# Patient Record
Sex: MALE | Race: OTHER | Employment: FULL TIME | URBAN - METROPOLITAN AREA
[De-identification: names, ages, dates, MRNs, and addresses within clinical notes are randomized per-mention and may not be internally consistent; named-entity substitution may affect disease eponyms.]

---

## 2018-04-13 PROCEDURE — 99284 EMERGENCY DEPT VISIT MOD MDM: CPT

## 2018-04-14 ENCOUNTER — HOSPITAL ENCOUNTER (EMERGENCY)
Facility: HOSPITAL | Age: 23
Discharge: HOME | End: 2018-04-14
Attending: EMERGENCY MEDICINE
Payer: COMMERCIAL

## 2018-04-14 ENCOUNTER — APPOINTMENT (OUTPATIENT)
Dept: GENERAL RADIOLOGY | Facility: HOSPITAL | Age: 23
End: 2018-04-14
Attending: EMERGENCY MEDICINE
Payer: COMMERCIAL

## 2018-04-14 VITALS
DIASTOLIC BLOOD PRESSURE: 68 MMHG | SYSTOLIC BLOOD PRESSURE: 125 MMHG | BODY MASS INDEX: 23.86 KG/M2 | TEMPERATURE: 99 F | OXYGEN SATURATION: 99 % | RESPIRATION RATE: 20 BRPM | HEART RATE: 87 BPM | WEIGHT: 152 LBS | HEIGHT: 67 IN

## 2018-04-14 DIAGNOSIS — Q67.6 PECTUS EXCAVATUM: Primary | ICD-10-CM

## 2018-04-14 DIAGNOSIS — R07.89 CHEST WALL PAIN: ICD-10-CM

## 2018-04-14 PROCEDURE — 93005 ELECTROCARDIOGRAM TRACING: CPT

## 2018-04-14 PROCEDURE — 71046 X-RAY EXAM CHEST 2 VIEWS: CPT | Performed by: EMERGENCY MEDICINE

## 2018-04-14 PROCEDURE — 93010 ELECTROCARDIOGRAM REPORT: CPT | Performed by: EMERGENCY MEDICINE

## 2018-04-14 NOTE — ED INITIAL ASSESSMENT (HPI)
LEft sided chest pain and SOB for the last 3 years, states that symptoms worsened yesterday. PT also reports he is \"Shaking\" and dizzy.

## 2018-04-14 NOTE — ED PROVIDER NOTES
Patient Seen in: [facility] Emergency Department     History   Patient presents with:  Chest Pain    Stated Complaint: SOB, chest pain    HPI    26year old male complains of \"chest pain all my life\" is worse in the past 2 years since being in a c rate and regular rhythm. Pulmonary/Chest: Effort normal and breath sounds normal. No accessory muscle usage or stridor. No apnea and no tachypnea. No respiratory distress. He exhibits no retraction. Abdominal: Soft. There is no tenderness.    [address] discussed pertinent results, any required  acute management issues, and/or I did need for follow-up, with the patient/gaurdian. See radiology reports for details.      Monitor Interpretation: normal sinus rhythm with a rate of 80  Oxygen Saturation: 98% on up information were provided prior to discharge from the ED if sent home.  We also recommended that the patient schedule follow up care with a primary care provider as soon as possible to obtain basic health screening including reassessment of blood pressur

## 2018-04-16 ENCOUNTER — OFFICE VISIT (OUTPATIENT)
Dept: FAMILY MEDICINE CLINIC | Facility: CLINIC | Age: 23
End: 2018-04-16

## 2018-04-16 VITALS
WEIGHT: 156 LBS | DIASTOLIC BLOOD PRESSURE: 76 MMHG | BODY MASS INDEX: 24 KG/M2 | SYSTOLIC BLOOD PRESSURE: 114 MMHG | TEMPERATURE: 98 F | HEART RATE: 90 BPM | OXYGEN SATURATION: 95 %

## 2018-04-16 DIAGNOSIS — R06.02 SHORTNESS OF BREATH: Primary | ICD-10-CM

## 2018-04-16 DIAGNOSIS — Q67.6 CONGENITAL PECTUS EXCAVATUM: ICD-10-CM

## 2018-04-16 DIAGNOSIS — R07.89 MUSCULAR CHEST PAIN: ICD-10-CM

## 2018-04-16 PROCEDURE — 1111F DSCHRG MED/CURRENT MED MERGE: CPT | Performed by: FAMILY MEDICINE

## 2018-04-16 PROCEDURE — 99212 OFFICE O/P EST SF 10 MIN: CPT | Performed by: FAMILY MEDICINE

## 2018-04-16 PROCEDURE — 99203 OFFICE O/P NEW LOW 30 MIN: CPT | Performed by: FAMILY MEDICINE

## 2018-04-16 NOTE — PROGRESS NOTES
HPI:    Patient ID: Ira Whitten is a 26year old male. HPI  Patient presents with:  ER F/U: f/u [facility] E.R 4/14-DX: Pectus excavatum, chest pain  Has chest pain with minimal exertion. At times SOB. Seen in ED and released after testing.  Chronic and

## 2018-05-18 ENCOUNTER — HOSPITAL ENCOUNTER (EMERGENCY)
Facility: HOSPITAL | Age: 23
Discharge: HOME | End: 2018-05-18
Attending: EMERGENCY MEDICINE
Payer: COMMERCIAL

## 2018-05-18 VITALS
HEIGHT: 67 IN | BODY MASS INDEX: 23.54 KG/M2 | TEMPERATURE: 99 F | DIASTOLIC BLOOD PRESSURE: 81 MMHG | SYSTOLIC BLOOD PRESSURE: 123 MMHG | RESPIRATION RATE: 20 BRPM | OXYGEN SATURATION: 99 % | HEART RATE: 101 BPM | WEIGHT: 150 LBS

## 2018-05-18 DIAGNOSIS — H66.91 RIGHT OTITIS MEDIA, UNSPECIFIED OTITIS MEDIA TYPE: Primary | ICD-10-CM

## 2018-05-18 PROCEDURE — 96372 THER/PROPH/DIAG INJ SC/IM: CPT

## 2018-05-18 PROCEDURE — 99284 EMERGENCY DEPT VISIT MOD MDM: CPT

## 2018-05-18 PROCEDURE — 94640 AIRWAY INHALATION TREATMENT: CPT

## 2018-05-18 RX ORDER — MELOXICAM 7.5 MG/1
7.5 TABLET ORAL DAILY
Qty: 14 TABLET | Refills: 0 | Status: SHIPPED | OUTPATIENT
Start: 2018-05-18 | End: 2018-05-25

## 2018-05-18 RX ORDER — IBUPROFEN 800 MG/1
800 TABLET ORAL ONCE
Status: COMPLETED | OUTPATIENT
Start: 2018-05-18 | End: 2018-05-18

## 2018-05-18 RX ORDER — ALBUTEROL SULFATE 90 UG/1
2 AEROSOL, METERED RESPIRATORY (INHALATION) EVERY 4 HOURS PRN
Qty: 1 INHALER | Refills: 0 | Status: SHIPPED | OUTPATIENT
Start: 2018-05-18 | End: 2018-05-25

## 2018-05-18 RX ORDER — IPRATROPIUM BROMIDE AND ALBUTEROL SULFATE 2.5; .5 MG/3ML; MG/3ML
3 SOLUTION RESPIRATORY (INHALATION) ONCE
Status: COMPLETED | OUTPATIENT
Start: 2018-05-18 | End: 2018-05-18

## 2018-05-18 RX ORDER — KETOROLAC TROMETHAMINE 30 MG/ML
60 INJECTION, SOLUTION INTRAMUSCULAR; INTRAVENOUS ONCE
Status: DISCONTINUED | OUTPATIENT
Start: 2018-05-18 | End: 2018-05-18

## 2018-05-18 RX ORDER — AMOXICILLIN 875 MG/1
875 TABLET, COATED ORAL ONCE
Status: COMPLETED | OUTPATIENT
Start: 2018-05-18 | End: 2018-05-18

## 2018-05-18 RX ORDER — AMOXICILLIN 875 MG/1
875 TABLET, COATED ORAL 2 TIMES DAILY
Qty: 20 TABLET | Refills: 0 | Status: SHIPPED | OUTPATIENT
Start: 2018-05-18 | End: 2018-05-28

## 2018-05-18 NOTE — ED NOTES
Pt verbalized having a runny nose & cough for a week. Pt denied fevers. Pt said that today as he was trying to use the humidifier for his runny nose, he bent and then felt right ear pain. Pt said that something felt in his ear.  Pt denied any drainage from

## 2018-05-18 NOTE — ED PROVIDER NOTES
Patient Seen in: [facility] AND [facility] Emergency Department    History   Patient presents with:  Cough/URI    Stated Complaint: Cough     HPI    25 yo M without PMH presenting with three days of cough/sore throat now with several hours of atraumatic right ear Cooperative. Nursing note and vitals reviewed.         ED Course   Labs Reviewed - No data to display    MDM     DIFFERENTIAL DIAGNOSIS: After history and physical exam differential diagnosis includes but is not limited to URI, strep pharyngitis, otitis me

## 2018-05-21 ENCOUNTER — APPOINTMENT (OUTPATIENT)
Dept: GENERAL RADIOLOGY | Facility: HOSPITAL | Age: 23
End: 2018-05-21
Attending: EMERGENCY MEDICINE
Payer: MEDICAID

## 2018-05-21 ENCOUNTER — HOSPITAL ENCOUNTER (EMERGENCY)
Facility: HOSPITAL | Age: 23
Discharge: HOME | End: 2018-05-21
Attending: EMERGENCY MEDICINE
Payer: MEDICAID

## 2018-05-21 VITALS
SYSTOLIC BLOOD PRESSURE: 135 MMHG | HEART RATE: 97 BPM | OXYGEN SATURATION: 99 % | BODY MASS INDEX: 23.86 KG/M2 | DIASTOLIC BLOOD PRESSURE: 75 MMHG | TEMPERATURE: 98 F | RESPIRATION RATE: 16 BRPM | HEIGHT: 67 IN | WEIGHT: 152 LBS

## 2018-05-21 DIAGNOSIS — J40 BRONCHITIS: Primary | ICD-10-CM

## 2018-05-21 PROCEDURE — 99283 EMERGENCY DEPT VISIT LOW MDM: CPT

## 2018-05-21 PROCEDURE — 71046 X-RAY EXAM CHEST 2 VIEWS: CPT | Performed by: EMERGENCY MEDICINE

## 2018-05-21 RX ORDER — PSEUDOEPHEDRINE HYDROCHLORIDE 30 MG/1
30 TABLET ORAL EVERY 4 HOURS PRN
Qty: 24 TABLET | Refills: 0 | Status: SHIPPED | OUTPATIENT
Start: 2018-05-21 | End: 2018-05-25

## 2018-05-21 RX ORDER — BENZONATATE 100 MG/1
100 CAPSULE ORAL 3 TIMES DAILY PRN
Qty: 30 CAPSULE | Refills: 0 | Status: SHIPPED | OUTPATIENT
Start: 2018-05-21 | End: 2018-05-25

## 2018-05-21 NOTE — ED PROVIDER NOTES
Patient Seen in: [facility] Emergency Department    History   Patient presents with:  Cough/URI      HPI    Patient presents to the ED complaining of a dry cough for the past 7 days, occasional posttussive emesis emesis.   Ongoing right ear pain, r exhibits no tenderness. Dry cough on exam   Abdominal: Soft. He exhibits no distension. Musculoskeletal: He exhibits no edema or deformity. Neurological: He is alert. Skin: Skin is warm and dry. Nursing note and vitals reviewed.       ED Course [place] [address]  [phone]    Schedule an appointment as soon as possible for a visit in 3 days        Medications Prescribed:  Discharge Medication List as of 5/21/2018  4:33 AM    START taking these medications    benzonatate 100 MG Oral C

## 2018-05-25 ENCOUNTER — OFFICE VISIT (OUTPATIENT)
Dept: FAMILY MEDICINE CLINIC | Facility: CLINIC | Age: 23
End: 2018-05-25

## 2018-05-25 VITALS
SYSTOLIC BLOOD PRESSURE: 121 MMHG | WEIGHT: 151 LBS | DIASTOLIC BLOOD PRESSURE: 82 MMHG | TEMPERATURE: 98 F | HEART RATE: 89 BPM | BODY MASS INDEX: 24 KG/M2

## 2018-05-25 DIAGNOSIS — H60.501 ACUTE OTITIS EXTERNA OF RIGHT EAR, UNSPECIFIED TYPE: Primary | ICD-10-CM

## 2018-05-25 PROCEDURE — 99212 OFFICE O/P EST SF 10 MIN: CPT | Performed by: FAMILY MEDICINE

## 2018-05-25 PROCEDURE — 99213 OFFICE O/P EST LOW 20 MIN: CPT | Performed by: FAMILY MEDICINE

## 2018-05-25 RX ORDER — NEOMYCIN SULFATE, POLYMYXIN B SULFATE, HYDROCORTISONE 3.5; 10000; 1 MG/ML; [USP'U]/ML; MG/ML
SOLUTION/ DROPS AURICULAR (OTIC)
Qty: 1 BOTTLE | Refills: 0 | Status: SHIPPED | OUTPATIENT
Start: 2018-05-25 | End: 2020-02-13 | Stop reason: ALTCHOICE

## 2018-05-25 NOTE — PROGRESS NOTES
HPI:    Patient ID: Lexii Joens is a 26year old male. HPI  Patient presents with:  Ear Pain: RT ear pain with craking noise, was in ER for [facility] for ear infection on friday /18/18    Review of Systems   Constitutional: Negative.     HENT: Positive

## 2019-10-02 ENCOUNTER — HOSPITAL ENCOUNTER (EMERGENCY)
Facility: HOSPITAL | Age: 24
Discharge: HOME | End: 2019-10-02
Attending: EMERGENCY MEDICINE
Payer: COMMERCIAL

## 2019-10-02 ENCOUNTER — APPOINTMENT (OUTPATIENT)
Dept: CT IMAGING | Facility: HOSPITAL | Age: 24
End: 2019-10-02
Attending: EMERGENCY MEDICINE
Payer: COMMERCIAL

## 2019-10-02 VITALS
HEIGHT: 67 IN | TEMPERATURE: 98 F | OXYGEN SATURATION: 96 % | DIASTOLIC BLOOD PRESSURE: 68 MMHG | WEIGHT: 175 LBS | SYSTOLIC BLOOD PRESSURE: 126 MMHG | HEART RATE: 65 BPM | RESPIRATION RATE: 18 BRPM | BODY MASS INDEX: 27.47 KG/M2

## 2019-10-02 DIAGNOSIS — K52.9 GASTROENTERITIS: Primary | ICD-10-CM

## 2019-10-02 PROCEDURE — 99284 EMERGENCY DEPT VISIT MOD MDM: CPT

## 2019-10-02 PROCEDURE — 85025 COMPLETE CBC W/AUTO DIFF WBC: CPT

## 2019-10-02 PROCEDURE — 74177 CT ABD & PELVIS W/CONTRAST: CPT | Performed by: EMERGENCY MEDICINE

## 2019-10-02 PROCEDURE — 80048 BASIC METABOLIC PNL TOTAL CA: CPT | Performed by: EMERGENCY MEDICINE

## 2019-10-02 PROCEDURE — 96374 THER/PROPH/DIAG INJ IV PUSH: CPT

## 2019-10-02 PROCEDURE — 85025 COMPLETE CBC W/AUTO DIFF WBC: CPT | Performed by: EMERGENCY MEDICINE

## 2019-10-02 PROCEDURE — 96361 HYDRATE IV INFUSION ADD-ON: CPT

## 2019-10-02 PROCEDURE — 81001 URINALYSIS AUTO W/SCOPE: CPT | Performed by: EMERGENCY MEDICINE

## 2019-10-02 PROCEDURE — 80048 BASIC METABOLIC PNL TOTAL CA: CPT

## 2019-10-02 RX ORDER — ONDANSETRON 2 MG/ML
4 INJECTION INTRAMUSCULAR; INTRAVENOUS ONCE
Status: COMPLETED | OUTPATIENT
Start: 2019-10-02 | End: 2019-10-02

## 2019-10-02 RX ORDER — DICYCLOMINE HCL 20 MG
20 TABLET ORAL 4 TIMES DAILY PRN
Qty: 30 TABLET | Refills: 0 | Status: SHIPPED | OUTPATIENT
Start: 2019-10-02 | End: 2019-11-01

## 2019-10-02 RX ORDER — ONDANSETRON 4 MG/1
4 TABLET, ORALLY DISINTEGRATING ORAL EVERY 4 HOURS PRN
Qty: 10 TABLET | Refills: 0 | Status: SHIPPED | OUTPATIENT
Start: 2019-10-02 | End: 2019-10-09

## 2019-10-02 NOTE — ED PROVIDER NOTES
Patient Seen in: [facility] AND [facility] Emergency Department      History   Patient presents with:  Abdomen/Flank Pain (GI/)  Nausea/Vomiting/Diarrhea (gastrointestinal)    Stated Complaint: abdominal pain; black stool    HPI    30-year-old male with no sig src Oral   SpO2 97 %   O2 Device None (Room air)       Current:/77   Pulse 74   Temp 97.8 °F (36.6 °C) (Oral)   Resp 16   Ht 170.2 cm (5' 7\")   Wt 79.4 kg   SpO2 96%   BMI 27.41 kg/m²         Physical Exam   Constitutional: He is oriented to person, Result Value Ref Range    Hold Gold Auto Resulted    BASIC METABOLIC PANEL (8)    Collection Time: 10/02/19  5:49 AM   Result Value Ref Range    Glucose 87 70 - 99 mg/dL    Sodium 140 136 - 145 mmol/L    Potassium 3.8 3.5 - 5.1 mmol/L    Chloride 108 98 Neutrophil % 56.4 %    Lymphocyte % 26.6 %    Monocyte % 14.2 %    Eosinophil % 2.2 %    Basophil % 0.4 %    Immature Granulocyte % 0.2 %       Imaging Results Available and Reviewed by me while in ED:  Ct Abdomen+pelvis(contrast Only)(cpt=74177)    Result problem including gastroenteritis, diverticulitis, abscess.           Disposition and Plan     Clinical Impression:  Gastroenteritis  (primary encounter diagnosis)    Disposition:  Discharge  10/2/2019  8:08 am    Follow-up:  DO Mia Edwards [facility]

## 2019-10-02 NOTE — ED NOTES
[facility] Assuming care of [facility]. Pt resting on cart. Awaiting CT read. Refusing pain meds at this time.  leticia

## 2020-02-13 ENCOUNTER — OFFICE VISIT (OUTPATIENT)
Dept: FAMILY MEDICINE CLINIC | Facility: CLINIC | Age: 25
End: 2020-02-13
Payer: COMMERCIAL

## 2020-02-13 VITALS
HEART RATE: 87 BPM | WEIGHT: 180 LBS | BODY MASS INDEX: 28.25 KG/M2 | HEIGHT: 67 IN | TEMPERATURE: 98 F | DIASTOLIC BLOOD PRESSURE: 89 MMHG | SYSTOLIC BLOOD PRESSURE: 138 MMHG

## 2020-02-13 DIAGNOSIS — Z00.00 ROUTINE GENERAL MEDICAL EXAMINATION AT A HEALTH CARE FACILITY: Primary | ICD-10-CM

## 2020-02-13 PROCEDURE — 99395 PREV VISIT EST AGE 18-39: CPT | Performed by: FAMILY MEDICINE

## 2020-02-13 NOTE — PROGRESS NOTES
HPI:    Patient ID: Ramiro Love is a 23year old male.     HPI  Patient presents with:  Physical: annual physical,   Bump: bumps on head with dry skull, hair falling out   Allergic Rxn Allergies: cuts on tongue     Review of Systems   Constitutional: Differential With Platelet      TSH [E]      Meds This Visit:  Requested Prescriptions      No prescriptions requested or ordered in this encounter       Imaging & Referrals:  None       QC#[id number]

## 2020-02-18 ENCOUNTER — LAB ENCOUNTER (OUTPATIENT)
Dept: LAB | Age: 25
End: 2020-02-18
Attending: FAMILY MEDICINE
Payer: COMMERCIAL

## 2020-02-18 ENCOUNTER — OFFICE VISIT (OUTPATIENT)
Dept: FAMILY MEDICINE CLINIC | Facility: CLINIC | Age: 25
End: 2020-02-18
Payer: COMMERCIAL

## 2020-02-18 VITALS
BODY MASS INDEX: 28.25 KG/M2 | WEIGHT: 180 LBS | SYSTOLIC BLOOD PRESSURE: 140 MMHG | TEMPERATURE: 99 F | OXYGEN SATURATION: 96 % | DIASTOLIC BLOOD PRESSURE: 85 MMHG | HEIGHT: 67 IN | HEART RATE: 102 BPM

## 2020-02-18 DIAGNOSIS — Z00.00 ROUTINE GENERAL MEDICAL EXAMINATION AT A HEALTH CARE FACILITY: ICD-10-CM

## 2020-02-18 DIAGNOSIS — A08.4 VIRAL GASTROENTERITIS: Primary | ICD-10-CM

## 2020-02-18 DIAGNOSIS — R11.0 NAUSEA: ICD-10-CM

## 2020-02-18 LAB
ALBUMIN SERPL-MCNC: 4.5 G/DL (ref 3.4–5)
ALBUMIN/GLOB SERPL: 1.5 {RATIO} (ref 1–2)
ALP LIVER SERPL-CCNC: 96 U/L (ref 45–117)
ALT SERPL-CCNC: 270 U/L (ref 16–61)
ANION GAP SERPL CALC-SCNC: 5 MMOL/L (ref 0–18)
AST SERPL-CCNC: 79 U/L (ref 15–37)
BASOPHILS # BLD AUTO: 0.03 X10(3) UL (ref 0–0.2)
BASOPHILS NFR BLD AUTO: 0.4 %
BILIRUB SERPL-MCNC: 0.6 MG/DL (ref 0.1–2)
BUN BLD-MCNC: 10 MG/DL (ref 7–18)
BUN/CREAT SERPL: 9.9 (ref 10–20)
CALCIUM BLD-MCNC: 9.2 MG/DL (ref 8.5–10.1)
CHLORIDE SERPL-SCNC: 103 MMOL/L (ref 98–112)
CHOLEST SMN-MCNC: 155 MG/DL (ref ?–200)
CO2 SERPL-SCNC: 29 MMOL/L (ref 21–32)
CREAT BLD-MCNC: 1.01 MG/DL (ref 0.7–1.3)
DEPRECATED RDW RBC AUTO: 41 FL (ref 35.1–46.3)
EOSINOPHIL # BLD AUTO: 0.02 X10(3) UL (ref 0–0.7)
EOSINOPHIL NFR BLD AUTO: 0.3 %
ERYTHROCYTE [DISTWIDTH] IN BLOOD BY AUTOMATED COUNT: 12.1 % (ref 11–15)
GLOBULIN PLAS-MCNC: 3 G/DL (ref 2.8–4.4)
GLUCOSE BLD-MCNC: 102 MG/DL (ref 70–99)
HCT VFR BLD AUTO: 48.2 % (ref 39–53)
HDLC SERPL-MCNC: 54 MG/DL (ref 40–59)
HGB BLD-MCNC: 16.3 G/DL (ref 13–17.5)
IMM GRANULOCYTES # BLD AUTO: 0.03 X10(3) UL (ref 0–1)
IMM GRANULOCYTES NFR BLD: 0.4 %
LDLC SERPL CALC-MCNC: 94 MG/DL (ref ?–100)
LYMPHOCYTES # BLD AUTO: 0.51 X10(3) UL (ref 1–4)
LYMPHOCYTES NFR BLD AUTO: 6.4 %
M PROTEIN MFR SERPL ELPH: 7.5 G/DL (ref 6.4–8.2)
MCH RBC QN AUTO: 31.1 PG (ref 26–34)
MCHC RBC AUTO-ENTMCNC: 33.8 G/DL (ref 31–37)
MCV RBC AUTO: 92 FL (ref 80–100)
MONOCYTES # BLD AUTO: 1.04 X10(3) UL (ref 0.1–1)
MONOCYTES NFR BLD AUTO: 13.1 %
NEUTROPHILS # BLD AUTO: 6.33 X10 (3) UL (ref 1.5–7.7)
NEUTROPHILS # BLD AUTO: 6.33 X10(3) UL (ref 1.5–7.7)
NEUTROPHILS NFR BLD AUTO: 79.4 %
NONHDLC SERPL-MCNC: 101 MG/DL (ref ?–130)
OSMOLALITY SERPL CALC.SUM OF ELEC: 283 MOSM/KG (ref 275–295)
PATIENT FASTING Y/N/NP: YES
PATIENT FASTING Y/N/NP: YES
PLATELET # BLD AUTO: 173 10(3)UL (ref 150–450)
POTASSIUM SERPL-SCNC: 4.2 MMOL/L (ref 3.5–5.1)
RBC # BLD AUTO: 5.24 X10(6)UL (ref 4.3–5.7)
SODIUM SERPL-SCNC: 137 MMOL/L (ref 136–145)
TRIGL SERPL-MCNC: 37 MG/DL (ref 30–149)
TSI SER-ACNC: 0.58 MIU/ML (ref 0.36–3.74)
VLDLC SERPL CALC-MCNC: 7 MG/DL (ref 0–30)
WBC # BLD AUTO: 8 X10(3) UL (ref 4–11)

## 2020-02-18 PROCEDURE — 84443 ASSAY THYROID STIM HORMONE: CPT

## 2020-02-18 PROCEDURE — 80053 COMPREHEN METABOLIC PANEL: CPT

## 2020-02-18 PROCEDURE — 85025 COMPLETE CBC W/AUTO DIFF WBC: CPT

## 2020-02-18 PROCEDURE — 99213 OFFICE O/P EST LOW 20 MIN: CPT | Performed by: FAMILY MEDICINE

## 2020-02-18 PROCEDURE — 36415 COLL VENOUS BLD VENIPUNCTURE: CPT

## 2020-02-18 PROCEDURE — 80061 LIPID PANEL: CPT

## 2020-02-18 RX ORDER — ONDANSETRON 4 MG/1
4 TABLET, FILM COATED ORAL EVERY 8 HOURS PRN
Qty: 20 TABLET | Refills: 0 | Status: SHIPPED | OUTPATIENT
Start: 2020-02-18 | End: 2021-12-09

## 2020-02-18 NOTE — PATIENT INSTRUCTIONS
Viral Diarrhea (Adult)    Diarrhea caused by a virus is often called viral gastroenteritis. Many people call it the \"stomach flu,\" but it has nothing to do with influenza.  The virus that causes diarrhea affects the stomach and intestinal tract and usua · You may use acetaminophen or NSAIDS such as ibuprofen or naproxen to control fever unless another medicine was prescribed.  If you have chronic liver or kidney disease or ever had a stomach ulcer or gastrointestinal bleeding, talk with your healthcare pr During the next 24 hours (the second day) you may add the following to the above if you have improved:  · Hot cereal, plain toast, bread, rolls, crackers  · Plain noodles, rice, mashed potatoes, chicken noodle or rice soup  · Unsweetened canned fruit like · Severe drowsiness or trouble awakening  · Fainting or loss of consciousness  · Rapid heart rate  · Seizure  · Stiff neck  Date Last Reviewed: 3/1/2018  © 3673-6447 [employer]. [employer] [facility], [address] [place] [address]. All rights reserved. · Over-the-counter diarrhea or nausea medicines are generally OK unless you have bleeding, fever, or severe abdominal pain. · You may use acetaminophen or NSAID medicines like ibuprofen or naproxen to reduce pain and fever.  Don’t use these if you have chr · Don’t force yourself to eat, especially if you are having cramping, vomiting, or diarrhea. Don’t eat large amounts at a time, even if you are hungry. · If you eat, avoid fatty, greasy, spicy, or fried foods.   · Don’t eat dairy foods or drink milk if you Call your healthcare provider right away if any of these occur:  · Abdominal pain that gets worse  · Constant lower right abdominal pain  · Continued vomiting and inability to keep liquids down  · Diarrhea more than 5 times a day  · Blood in vomit or stool

## 2020-02-18 NOTE — PROGRESS NOTES
HPI:    Patient ID: Renuka Dover is a 23year old male. HPI  Patient presents with:  Vomiting: since Sunday   Body ache and/or chills  Diarrhea  Cough  Fever: last night      patient here with c/o nausea/ vomiting.   Vomiting better but nausea pers total) by mouth every 8 (eight) hours as needed for Nausea.        Imaging & Referrals:  None       XS#[id number]

## 2020-03-05 DIAGNOSIS — R74.8 ELEVATED LIVER ENZYMES: Primary | ICD-10-CM

## 2021-12-09 ENCOUNTER — OFFICE VISIT (OUTPATIENT)
Dept: INTERNAL MEDICINE CLINIC | Facility: CLINIC | Age: 26
End: 2021-12-09
Payer: COMMERCIAL

## 2021-12-09 VITALS
HEIGHT: 67 IN | OXYGEN SATURATION: 99 % | DIASTOLIC BLOOD PRESSURE: 72 MMHG | BODY MASS INDEX: 29.66 KG/M2 | SYSTOLIC BLOOD PRESSURE: 112 MMHG | WEIGHT: 189 LBS | HEART RATE: 98 BPM

## 2021-12-09 DIAGNOSIS — F41.1 GENERALIZED ANXIETY DISORDER: ICD-10-CM

## 2021-12-09 DIAGNOSIS — R51.9 NONINTRACTABLE HEADACHE, UNSPECIFIED CHRONICITY PATTERN, UNSPECIFIED HEADACHE TYPE: ICD-10-CM

## 2021-12-09 DIAGNOSIS — Z00.00 HEALTH MAINTENANCE EXAMINATION: Primary | ICD-10-CM

## 2021-12-09 DIAGNOSIS — M54.50 CHRONIC RIGHT-SIDED LOW BACK PAIN WITHOUT SCIATICA: ICD-10-CM

## 2021-12-09 DIAGNOSIS — G89.29 CHRONIC RIGHT-SIDED LOW BACK PAIN WITHOUT SCIATICA: ICD-10-CM

## 2021-12-09 DIAGNOSIS — R74.8 ELEVATED LIVER ENZYMES: ICD-10-CM

## 2021-12-09 DIAGNOSIS — K59.00 CONSTIPATION, UNSPECIFIED CONSTIPATION TYPE: ICD-10-CM

## 2021-12-09 DIAGNOSIS — E66.3 OVERWEIGHT (BMI 25.0-29.9): ICD-10-CM

## 2021-12-09 PROBLEM — F41.9 ANXIETY: Status: ACTIVE | Noted: 2021-12-09

## 2021-12-09 PROCEDURE — 90471 IMMUNIZATION ADMIN: CPT | Performed by: FAMILY MEDICINE

## 2021-12-09 PROCEDURE — 90651 9VHPV VACCINE 2/3 DOSE IM: CPT | Performed by: FAMILY MEDICINE

## 2021-12-09 PROCEDURE — 3008F BODY MASS INDEX DOCD: CPT | Performed by: FAMILY MEDICINE

## 2021-12-09 PROCEDURE — 82728 ASSAY OF FERRITIN: CPT | Performed by: FAMILY MEDICINE

## 2021-12-09 PROCEDURE — 36415 COLL VENOUS BLD VENIPUNCTURE: CPT | Performed by: FAMILY MEDICINE

## 2021-12-09 PROCEDURE — 99203 OFFICE O/P NEW LOW 30 MIN: CPT | Performed by: FAMILY MEDICINE

## 2021-12-09 PROCEDURE — 83036 HEMOGLOBIN GLYCOSYLATED A1C: CPT | Performed by: FAMILY MEDICINE

## 2021-12-09 PROCEDURE — 83540 ASSAY OF IRON: CPT | Performed by: FAMILY MEDICINE

## 2021-12-09 PROCEDURE — 84466 ASSAY OF TRANSFERRIN: CPT | Performed by: FAMILY MEDICINE

## 2021-12-09 PROCEDURE — 90715 TDAP VACCINE 7 YRS/> IM: CPT | Performed by: FAMILY MEDICINE

## 2021-12-09 PROCEDURE — 90686 IIV4 VACC NO PRSV 0.5 ML IM: CPT | Performed by: FAMILY MEDICINE

## 2021-12-09 PROCEDURE — 80074 ACUTE HEPATITIS PANEL: CPT | Performed by: FAMILY MEDICINE

## 2021-12-09 PROCEDURE — 3074F SYST BP LT 130 MM HG: CPT | Performed by: FAMILY MEDICINE

## 2021-12-09 PROCEDURE — 96127 BRIEF EMOTIONAL/BEHAV ASSMT: CPT | Performed by: FAMILY MEDICINE

## 2021-12-09 PROCEDURE — 80053 COMPREHEN METABOLIC PANEL: CPT | Performed by: FAMILY MEDICINE

## 2021-12-09 PROCEDURE — 3078F DIAST BP <80 MM HG: CPT | Performed by: FAMILY MEDICINE

## 2021-12-09 PROCEDURE — 90472 IMMUNIZATION ADMIN EACH ADD: CPT | Performed by: FAMILY MEDICINE

## 2021-12-09 PROCEDURE — 99385 PREV VISIT NEW AGE 18-39: CPT | Performed by: FAMILY MEDICINE

## 2021-12-09 NOTE — PATIENT INSTRUCTIONS
PATIENT INSTRUCTIONS    • Thank you for seeing me today, it was a pleasure taking care of you. • Please check out at the  and schedule a follow up appointment. Return in about 3 months (around 3/9/2022) for follow up.   • Please get your labs dr

## 2021-12-09 NOTE — ASSESSMENT & PLAN NOTE
Exercise and diet advised. Had CMP, lipid panel checked recently through work - will scan in records. Elevated liver enzymes - will check additional tests. Additionally check routine screen: HIV, hepatitis C  Tdap today. Flu shot today.   HPV vaccine to

## 2021-12-09 NOTE — PROGRESS NOTES
Pt presented to clinic today for blood draw. Per physician able to draw orders. Orders  documented within chart. Pt tolerated lab draw well.  verified.   Orders drawn include: Ferritin, Iron and tibc, HCV, A1C, and CMP  Site of draw: right arm

## 2021-12-09 NOTE — ASSESSMENT & PLAN NOTE
Patient reports intermittent episodes of bloating and constipation. I advised hydration, exercise, increasing fiber intake. Can use Metamucil or Citrucel as needed. Can also use MiraLAX as needed. Follow-up as needed.

## 2021-12-09 NOTE — ASSESSMENT & PLAN NOTE
Headaches, possibly migraine related. He does have these on a regular basis. For now I recommend that he keep a log for his headaches and bring them in so that I can assess how frequently this is truly occurring.   He does find good relief with use of ove

## 2021-12-09 NOTE — ASSESSMENT & PLAN NOTE
Advise healthy diet and exercise. He needs to increase his fiber intake as this is is affecting his bowel movements. Portion control emphasized. Can use nacho such as my fitness pal or weight watchers as needed.   Follow-up with me in 3 months

## 2021-12-09 NOTE — ASSESSMENT & PLAN NOTE
New diagnosis of generalized anxiety disorder. Patient reports that he has been anxious at baseline for a long time. [facility] behavioral health referral provided. Can consider medication therapy moving forward as well.   May benefit from cutting down

## 2021-12-09 NOTE — ASSESSMENT & PLAN NOTE
Unclear etiology. Patient does take quite a bit of Tylenol at this time–advised stopping Tylenol and acetaminophen products completely. Denies drinking alcohol on a regular basis. Patient's BMI is 29. 6–could be fatty liver as well.   I will check basic b

## 2021-12-09 NOTE — ASSESSMENT & PLAN NOTE
Patient reports history of chronic right lower back pain. I suspect this is more of a muscle strain. He has been seeing a chiropractor. I advised that he not see a chiropractor for now.   Instead if his back pain is bothering him, follow-up with me and I

## 2021-12-09 NOTE — PROGRESS NOTES
FAMILY MEDICINE CLINIC NOTE    HPI  Amanda Redman is a 27year old male presenting for physical    #Health Maintenance  -Diet: \"very bad\" - eat a lot of fast food. Stopped last week. Stopped pop for the past 2 months.   -Exercise: None currently   -Caryn cough, no SOB  CV: No chest pain, no palpitations  GI: No abd pain, no N/V/D  MSK: No edema, + chronic back pain  SKIN: No new rashes  NEURO: No numbness, no tingling, + HA    HEALTH MAINTENANCE CHECKLIST  Health Maintenance Topics with due status: Overdue Sabianist, not spiritual      Other: Was adopted    Social Determinants of Health  Financial Resource Strain: Not on file  Food Insecurity: Not on file  Transportation Needs: Not on file  Physical Activity: Not on file  Stress: Not on file  Social Connecti 02/18/2020    MOABSO 1.04 (H) 02/18/2020    EOABSO 0.02 02/18/2020    BAABSO 0.03 02/18/2020       Lab Results   Component Value Date     02/18/2020    K 4.2 02/18/2020     02/18/2020    CO2 29.0 02/18/2020    ANIONGAP 5 02/18/2020    BUN 10 02 follow-up with me and I can send him to physical therapy as needed. Other    Elevated liver enzymes     Unclear etiology. Patient does take quite a bit of Tylenol at this time–advised stopping Tylenol and acetaminophen products completely.   Viridiana Hutton medication. Can continue to monitor symptoms for now and use ibuprofen as needed. Advised that since his liver enzymes are elevated that he should hold off on using any acetaminophen related products for pain management.   In the meantime also stay well-h

## 2022-01-04 ENCOUNTER — HOSPITAL ENCOUNTER (OUTPATIENT)
Dept: ULTRASOUND IMAGING | Age: 27
Discharge: HOME | End: 2022-01-04
Attending: FAMILY MEDICINE
Payer: COMMERCIAL

## 2022-01-04 DIAGNOSIS — R74.8 ELEVATED LIVER ENZYMES: ICD-10-CM

## 2022-01-04 PROBLEM — K76.0 NON-ALCOHOLIC FATTY LIVER DISEASE: Status: ACTIVE | Noted: 2021-12-09

## 2022-01-04 PROCEDURE — 76705 ECHO EXAM OF ABDOMEN: CPT | Performed by: FAMILY MEDICINE

## 2022-02-08 ENCOUNTER — OFFICE VISIT (OUTPATIENT)
Dept: INTERNAL MEDICINE CLINIC | Facility: CLINIC | Age: 27
End: 2022-02-08
Payer: COMMERCIAL

## 2022-02-08 VITALS
OXYGEN SATURATION: 99 % | WEIGHT: 179.81 LBS | HEIGHT: 67 IN | BODY MASS INDEX: 28.22 KG/M2 | DIASTOLIC BLOOD PRESSURE: 74 MMHG | SYSTOLIC BLOOD PRESSURE: 118 MMHG | HEART RATE: 88 BPM

## 2022-02-08 DIAGNOSIS — K76.0 NON-ALCOHOLIC FATTY LIVER DISEASE: Primary | ICD-10-CM

## 2022-02-08 DIAGNOSIS — Z00.00 HEALTH MAINTENANCE EXAMINATION: ICD-10-CM

## 2022-02-08 DIAGNOSIS — F41.1 GENERALIZED ANXIETY DISORDER: ICD-10-CM

## 2022-02-08 DIAGNOSIS — E66.3 OVERWEIGHT (BMI 25.0-29.9): ICD-10-CM

## 2022-02-08 DIAGNOSIS — R51.9 NONINTRACTABLE HEADACHE, UNSPECIFIED CHRONICITY PATTERN, UNSPECIFIED HEADACHE TYPE: ICD-10-CM

## 2022-02-08 DIAGNOSIS — L98.9 SCALP LESION: ICD-10-CM

## 2022-02-08 DIAGNOSIS — M79.672 HEEL PAIN, CHRONIC, LEFT: ICD-10-CM

## 2022-02-08 DIAGNOSIS — G89.29 HEEL PAIN, CHRONIC, LEFT: ICD-10-CM

## 2022-02-08 LAB
ALBUMIN SERPL-MCNC: 4.5 G/DL (ref 3.4–5)
ALP LIVER SERPL-CCNC: 105 U/L
ALT SERPL-CCNC: 107 U/L
AST SERPL-CCNC: 37 U/L (ref 15–37)
BILIRUB DIRECT SERPL-MCNC: <0.1 MG/DL (ref 0–0.2)
BILIRUB SERPL-MCNC: 0.3 MG/DL (ref 0.1–2)
PROT SERPL-MCNC: 7.6 G/DL (ref 6.4–8.2)

## 2022-02-08 PROCEDURE — 99214 OFFICE O/P EST MOD 30 MIN: CPT | Performed by: FAMILY MEDICINE

## 2022-02-08 PROCEDURE — 80076 HEPATIC FUNCTION PANEL: CPT | Performed by: FAMILY MEDICINE

## 2022-02-08 PROCEDURE — 3008F BODY MASS INDEX DOCD: CPT | Performed by: FAMILY MEDICINE

## 2022-02-08 PROCEDURE — 3074F SYST BP LT 130 MM HG: CPT | Performed by: FAMILY MEDICINE

## 2022-02-08 PROCEDURE — 3078F DIAST BP <80 MM HG: CPT | Performed by: FAMILY MEDICINE

## 2022-02-08 PROCEDURE — 87389 HIV-1 AG W/HIV-1&-2 AB AG IA: CPT | Performed by: FAMILY MEDICINE

## 2022-02-08 NOTE — ASSESSMENT & PLAN NOTE
Notes history of intermittent scalp lesions. No significant lesions seen on examinations. Does report significant itching, and states that after he shaves his head the lesions get worse. Does have family history of psoriasis. Referral to dermatology provided.

## 2022-02-08 NOTE — ASSESSMENT & PLAN NOTE
Reports anxiety is improved. Advised continuing to cut down on marijuana - complete cessation if able. Declined referrals from [facility]. If worsening anxiety, follow up with me as needed. Otherwise follow up in 6 months.

## 2022-02-08 NOTE — ASSESSMENT & PLAN NOTE
Repeat LFTs today. Has lost some weight. Will monitor. If still very elevated, will consider more extensive liver lab work up. Follow up in 6 months. Epidural

## 2022-02-09 PROCEDURE — 90471 IMMUNIZATION ADMIN: CPT | Performed by: FAMILY MEDICINE

## 2022-02-09 PROCEDURE — 3008F BODY MASS INDEX DOCD: CPT | Performed by: FAMILY MEDICINE

## 2022-02-09 PROCEDURE — 3078F DIAST BP <80 MM HG: CPT | Performed by: FAMILY MEDICINE

## 2022-02-09 PROCEDURE — 90651 9VHPV VACCINE 2/3 DOSE IM: CPT | Performed by: FAMILY MEDICINE

## 2022-02-09 PROCEDURE — 3074F SYST BP LT 130 MM HG: CPT | Performed by: FAMILY MEDICINE

## 2022-04-30 ENCOUNTER — HOSPITAL ENCOUNTER (EMERGENCY)
Facility: HOSPITAL | Age: 27
Discharge: HOME | End: 2022-04-30
Attending: EMERGENCY MEDICINE
Payer: COMMERCIAL

## 2022-04-30 VITALS
SYSTOLIC BLOOD PRESSURE: 127 MMHG | TEMPERATURE: 97 F | DIASTOLIC BLOOD PRESSURE: 82 MMHG | OXYGEN SATURATION: 96 % | HEART RATE: 77 BPM | RESPIRATION RATE: 18 BRPM

## 2022-04-30 DIAGNOSIS — G89.29 CHRONIC LEFT-SIDED LOW BACK PAIN WITHOUT SCIATICA: Primary | ICD-10-CM

## 2022-04-30 DIAGNOSIS — M54.50 CHRONIC LEFT-SIDED LOW BACK PAIN WITHOUT SCIATICA: Primary | ICD-10-CM

## 2022-04-30 PROCEDURE — 96372 THER/PROPH/DIAG INJ SC/IM: CPT

## 2022-04-30 PROCEDURE — 99283 EMERGENCY DEPT VISIT LOW MDM: CPT

## 2022-04-30 RX ORDER — HYDROCODONE BITARTRATE AND ACETAMINOPHEN 5; 325 MG/1; MG/1
1 TABLET ORAL EVERY 6 HOURS PRN
Qty: 15 TABLET | Refills: 0 | Status: SHIPPED | OUTPATIENT
Start: 2022-04-30

## 2022-04-30 RX ORDER — CYCLOBENZAPRINE HCL 10 MG
10 TABLET ORAL 3 TIMES DAILY PRN
Qty: 20 TABLET | Refills: 0 | Status: SHIPPED | OUTPATIENT
Start: 2022-04-30 | End: 2022-05-07

## 2022-04-30 RX ORDER — KETOROLAC TROMETHAMINE 30 MG/ML
30 INJECTION, SOLUTION INTRAMUSCULAR; INTRAVENOUS ONCE
Status: COMPLETED | OUTPATIENT
Start: 2022-04-30 | End: 2022-04-30

## 2022-04-30 RX ORDER — KETOROLAC TROMETHAMINE 10 MG/1
10 TABLET, FILM COATED ORAL EVERY 6 HOURS PRN
Qty: 20 TABLET | Refills: 0 | Status: SHIPPED | OUTPATIENT
Start: 2022-04-30 | End: 2022-05-05

## 2022-05-01 NOTE — ED INITIAL ASSESSMENT (HPI)
Acute on chronic lower back pain. Pt reports he does heavy lifting at work. Denies numbness/tingling.

## 2022-05-11 ENCOUNTER — TELEPHONE (OUTPATIENT)
Dept: PHYSICAL MEDICINE AND REHAB | Facility: CLINIC | Age: 27
End: 2022-05-11

## 2022-05-11 NOTE — TELEPHONE ENCOUNTER
PSR-SHIRAZ to set up appt. Slot is on hold on 05/18 at 8:30 at [facility] with Dr Victor Hugo Mccormick.

## 2022-06-06 ENCOUNTER — OFFICE VISIT (OUTPATIENT)
Dept: INTERNAL MEDICINE CLINIC | Facility: CLINIC | Age: 27
End: 2022-06-06
Payer: COMMERCIAL

## 2022-06-06 VITALS
OXYGEN SATURATION: 97 % | BODY MASS INDEX: 28.88 KG/M2 | WEIGHT: 184 LBS | SYSTOLIC BLOOD PRESSURE: 118 MMHG | HEIGHT: 67 IN | DIASTOLIC BLOOD PRESSURE: 86 MMHG | HEART RATE: 87 BPM

## 2022-06-06 DIAGNOSIS — M54.41 ACUTE RIGHT-SIDED LOW BACK PAIN WITH RIGHT-SIDED SCIATICA: Primary | ICD-10-CM

## 2022-06-06 PROBLEM — M54.40 ACUTE RIGHT-SIDED LOW BACK PAIN WITH SCIATICA: Status: ACTIVE | Noted: 2021-12-09

## 2022-06-06 PROCEDURE — 99214 OFFICE O/P EST MOD 30 MIN: CPT | Performed by: FAMILY MEDICINE

## 2022-06-06 PROCEDURE — 3074F SYST BP LT 130 MM HG: CPT | Performed by: FAMILY MEDICINE

## 2022-06-06 PROCEDURE — 3008F BODY MASS INDEX DOCD: CPT | Performed by: FAMILY MEDICINE

## 2022-06-06 PROCEDURE — 3079F DIAST BP 80-89 MM HG: CPT | Performed by: FAMILY MEDICINE

## 2022-06-06 RX ORDER — NAPROXEN 500 MG/1
500 TABLET ORAL 2 TIMES DAILY WITH MEALS
Qty: 20 TABLET | Refills: 0 | Status: SHIPPED | OUTPATIENT
Start: 2022-06-06

## 2022-06-06 NOTE — ASSESSMENT & PLAN NOTE
Patient with acute back pain and sciatica. Unclear if there is a component of piriformis syndrome involved. Has not recently been using any sort of medication for pain. I will prescribe naproxen 500 mg twice daily for 10 days. If without improvement, can consider short course of steroids. Stretches and exercises provided. Recommend making appointment with physical therapy. Avoid sitting in place with wallet in back pocket. Use lumbar roll when seated for prolonged periods of time. Avoid heavy lifting at this time.

## 2022-08-15 ENCOUNTER — OFFICE VISIT (OUTPATIENT)
Dept: INTERNAL MEDICINE CLINIC | Facility: CLINIC | Age: 27
End: 2022-08-15
Payer: COMMERCIAL

## 2022-08-15 VITALS
HEART RATE: 100 BPM | SYSTOLIC BLOOD PRESSURE: 116 MMHG | DIASTOLIC BLOOD PRESSURE: 90 MMHG | BODY MASS INDEX: 28.66 KG/M2 | WEIGHT: 182.63 LBS | HEIGHT: 67 IN | OXYGEN SATURATION: 98 %

## 2022-08-15 DIAGNOSIS — R20.2 PARESTHESIA: ICD-10-CM

## 2022-08-15 DIAGNOSIS — Z00.00 HEALTH MAINTENANCE EXAMINATION: ICD-10-CM

## 2022-08-15 DIAGNOSIS — K76.0 NON-ALCOHOLIC FATTY LIVER DISEASE: Primary | ICD-10-CM

## 2022-08-15 LAB
ALBUMIN SERPL-MCNC: 4.5 G/DL (ref 3.4–5)
ALBUMIN/GLOB SERPL: 1.5 {RATIO} (ref 1–2)
ALP LIVER SERPL-CCNC: 89 U/L
ALT SERPL-CCNC: 92 U/L
ANION GAP SERPL CALC-SCNC: 7 MMOL/L (ref 0–18)
AST SERPL-CCNC: 35 U/L (ref 15–37)
BILIRUB SERPL-MCNC: 0.5 MG/DL (ref 0.1–2)
BUN BLD-MCNC: 13 MG/DL (ref 7–18)
BUN/CREAT SERPL: 11.9 (ref 10–20)
CALCIUM BLD-MCNC: 9.6 MG/DL (ref 8.5–10.1)
CHLORIDE SERPL-SCNC: 106 MMOL/L (ref 98–112)
CO2 SERPL-SCNC: 29 MMOL/L (ref 21–32)
CREAT BLD-MCNC: 1.09 MG/DL
FASTING STATUS PATIENT QL REPORTED: NO
GFR SERPLBLD BASED ON 1.73 SQ M-ARVRAT: 97 ML/MIN/1.73M2 (ref 60–?)
GLOBULIN PLAS-MCNC: 3 G/DL (ref 2.8–4.4)
GLUCOSE BLD-MCNC: 82 MG/DL (ref 70–99)
OSMOLALITY SERPL CALC.SUM OF ELEC: 293 MOSM/KG (ref 275–295)
POTASSIUM SERPL-SCNC: 3.8 MMOL/L (ref 3.5–5.1)
PROT SERPL-MCNC: 7.5 G/DL (ref 6.4–8.2)
SODIUM SERPL-SCNC: 142 MMOL/L (ref 136–145)

## 2022-08-15 PROCEDURE — 3074F SYST BP LT 130 MM HG: CPT | Performed by: FAMILY MEDICINE

## 2022-08-15 PROCEDURE — 90651 9VHPV VACCINE 2/3 DOSE IM: CPT | Performed by: FAMILY MEDICINE

## 2022-08-15 PROCEDURE — 90471 IMMUNIZATION ADMIN: CPT | Performed by: FAMILY MEDICINE

## 2022-08-15 PROCEDURE — 3080F DIAST BP >= 90 MM HG: CPT | Performed by: FAMILY MEDICINE

## 2022-08-15 PROCEDURE — 99213 OFFICE O/P EST LOW 20 MIN: CPT | Performed by: FAMILY MEDICINE

## 2022-08-15 PROCEDURE — 80053 COMPREHEN METABOLIC PANEL: CPT | Performed by: FAMILY MEDICINE

## 2022-08-15 PROCEDURE — 3008F BODY MASS INDEX DOCD: CPT | Performed by: FAMILY MEDICINE

## 2022-08-15 NOTE — ASSESSMENT & PLAN NOTE
Patient reports mild intermittent improvement with stretches and exercises provided. Can take NSAIDs as needed for pain. Recommend that he make an appointment with physical therapy if possible. Continue to use lumbar roll. Follow-up as needed.

## 2022-08-15 NOTE — ASSESSMENT & PLAN NOTE
Repeat CMP today. Overall liver enzymes have downtrended a bit since December. If remaining still elevated, consider additional blood work to further evaluate.

## 2022-10-26 ENCOUNTER — OFFICE VISIT (OUTPATIENT)
Dept: INTERNAL MEDICINE CLINIC | Facility: CLINIC | Age: 27
End: 2022-10-26
Payer: COMMERCIAL

## 2022-10-26 VITALS
WEIGHT: 184 LBS | OXYGEN SATURATION: 98 % | SYSTOLIC BLOOD PRESSURE: 136 MMHG | RESPIRATION RATE: 17 BRPM | BODY MASS INDEX: 28.88 KG/M2 | DIASTOLIC BLOOD PRESSURE: 82 MMHG | TEMPERATURE: 98 F | HEIGHT: 67 IN | HEART RATE: 91 BPM

## 2022-10-26 DIAGNOSIS — R20.2 PARESTHESIA: ICD-10-CM

## 2022-10-26 DIAGNOSIS — G89.29 CHRONIC RIGHT-SIDED LOW BACK PAIN WITH RIGHT-SIDED SCIATICA: ICD-10-CM

## 2022-10-26 DIAGNOSIS — L03.90 CELLULITIS, UNSPECIFIED CELLULITIS SITE: Primary | ICD-10-CM

## 2022-10-26 DIAGNOSIS — Z00.00 HEALTH MAINTENANCE EXAMINATION: ICD-10-CM

## 2022-10-26 DIAGNOSIS — K76.0 NON-ALCOHOLIC FATTY LIVER DISEASE: ICD-10-CM

## 2022-10-26 DIAGNOSIS — M54.41 CHRONIC RIGHT-SIDED LOW BACK PAIN WITH RIGHT-SIDED SCIATICA: ICD-10-CM

## 2022-10-26 PROBLEM — M54.40 CHRONIC RIGHT-SIDED LOW BACK PAIN WITH SCIATICA: Status: ACTIVE | Noted: 2021-12-09

## 2022-10-26 PROCEDURE — 3008F BODY MASS INDEX DOCD: CPT | Performed by: FAMILY MEDICINE

## 2022-10-26 PROCEDURE — 3075F SYST BP GE 130 - 139MM HG: CPT | Performed by: FAMILY MEDICINE

## 2022-10-26 PROCEDURE — 3079F DIAST BP 80-89 MM HG: CPT | Performed by: FAMILY MEDICINE

## 2022-10-26 PROCEDURE — 99214 OFFICE O/P EST MOD 30 MIN: CPT | Performed by: FAMILY MEDICINE

## 2022-10-26 RX ORDER — CEPHALEXIN 500 MG/1
500 CAPSULE ORAL 4 TIMES DAILY
Qty: 20 CAPSULE | Refills: 0 | Status: SHIPPED | OUTPATIENT
Start: 2022-10-26

## 2022-10-26 NOTE — ASSESSMENT & PLAN NOTE
Patient with crusted lesions on his right lower chin with surrounding erythema. Occurred after shaving injury. Possible history of impetigo as patient notes that it was more orange in color initially. No improvement with Lotrimin that he was using over-the-counter. Advised to discontinue shaving for now to allow skin healing. Discontinue Lotrimin. Start cephalexin 500 mg 4 times daily to see if there are any improvements. Moisturize with CeraVe. If without improvement, follow-up with me in 1 week. Has had scalp lesions in the past and family history of psoriasis- can consider this on differential as well.

## 2022-10-26 NOTE — ASSESSMENT & PLAN NOTE
Healthy diet and exercise advised again. He reports that he does not have time for further blood work at this time  Liver enzymes have trended downward slightly but are still quite elevated. During future appointment, will order additional liver work-up to further evaluate at this time.

## 2022-10-26 NOTE — ASSESSMENT & PLAN NOTE
Back pain is currently under control at this time. Lumbar roll is helpful  Reminded patient to continue stretches and exercises to prevent flares. Follow-up as needed.

## 2022-10-26 NOTE — PATIENT INSTRUCTIONS
PATIENT INSTRUCTIONS    Thank you for seeing me today, it was a pleasure taking care of you. Please check out at the  and schedule a follow up appointment. Return in about 1 year (around 10/26/2023), or if symptoms worsen or fail to improve.   Stop lotrimin  Take cephalexin 500 mg 4 times daily for 5 days   Can moisturize with Cerave  Consider neurology evaluation for your numbness  COVID booster  Don't shave for a while - allow full recovery    Best,  Dr. Main Barrier

## 2022-10-26 NOTE — ASSESSMENT & PLAN NOTE
Patient reports persistent bothersome paresthesias symptoms and discomfort in the right upper back. He describes this as numbness with also pain to the touch. He notes that the numbness comes and goes.   Has referral to neurology from prior

## 2023-03-03 NOTE — ASSESSMENT & PLAN NOTE
Patient reports persistent bothersome paresthesias symptoms and discomfort in the right upper back. He describes this as numbness with also pain to the touch. Refer to neurology to further evaluate. details… no gross abnormalities

## 2023-03-06 ENCOUNTER — TELEPHONE (OUTPATIENT)
Dept: INTERNAL MEDICINE CLINIC | Facility: CLINIC | Age: 28
End: 2023-03-06

## 2023-03-06 NOTE — TELEPHONE ENCOUNTER
Patient called, as he said he spoke to Dr. Donnie Ga about his headaches, and determined it is due to the lights. He was told that if Dr. Donnie Ga can write him a letter the reason why he gets the headaches due to the lights, he can have that sent to the  to have on file.

## 2023-06-20 ENCOUNTER — HOSPITAL ENCOUNTER (EMERGENCY)
Facility: HOSPITAL | Age: 28
Discharge: HOME | End: 2023-06-20
Attending: EMERGENCY MEDICINE
Payer: COMMERCIAL

## 2023-06-20 VITALS
TEMPERATURE: 98 F | DIASTOLIC BLOOD PRESSURE: 89 MMHG | SYSTOLIC BLOOD PRESSURE: 151 MMHG | OXYGEN SATURATION: 97 % | HEIGHT: 68 IN | RESPIRATION RATE: 18 BRPM | BODY MASS INDEX: 27.28 KG/M2 | HEART RATE: 97 BPM | WEIGHT: 180 LBS

## 2023-06-20 DIAGNOSIS — L03.113 CELLULITIS OF RIGHT ELBOW: Primary | ICD-10-CM

## 2023-06-20 PROCEDURE — 99284 EMERGENCY DEPT VISIT MOD MDM: CPT

## 2023-06-20 PROCEDURE — 99283 EMERGENCY DEPT VISIT LOW MDM: CPT

## 2023-06-20 RX ORDER — CEPHALEXIN 500 MG/1
500 CAPSULE ORAL 4 TIMES DAILY
Qty: 40 CAPSULE | Refills: 0 | Status: SHIPPED | OUTPATIENT
Start: 2023-06-20 | End: 2023-06-30

## 2023-06-20 RX ORDER — IBUPROFEN 600 MG/1
600 TABLET ORAL EVERY 8 HOURS PRN
Qty: 30 TABLET | Refills: 0 | Status: SHIPPED | OUTPATIENT
Start: 2023-06-20 | End: 2023-06-27

## 2023-06-20 RX ORDER — SULFAMETHOXAZOLE AND TRIMETHOPRIM 800; 160 MG/1; MG/1
1 TABLET ORAL 2 TIMES DAILY
Qty: 20 TABLET | Refills: 0 | Status: SHIPPED | OUTPATIENT
Start: 2023-06-20 | End: 2023-06-30

## 2023-06-20 NOTE — ED INITIAL ASSESSMENT (HPI)
Patient presents to Er with complaints of right elbow pain and swelling. Patient also admits to fever. Denies trauma or known injury to area. Swelling noted in triage, warm to touch.

## 2023-08-02 ENCOUNTER — HOSPITAL ENCOUNTER (OUTPATIENT)
Dept: GENERAL RADIOLOGY | Facility: HOSPITAL | Age: 28
Discharge: HOME | End: 2023-08-02
Attending: FAMILY MEDICINE
Payer: COMMERCIAL

## 2023-08-02 ENCOUNTER — LAB ENCOUNTER (OUTPATIENT)
Dept: LAB | Facility: HOSPITAL | Age: 28
End: 2023-08-02
Attending: FAMILY MEDICINE
Payer: COMMERCIAL

## 2023-08-02 ENCOUNTER — OFFICE VISIT (OUTPATIENT)
Dept: INTERNAL MEDICINE CLINIC | Facility: CLINIC | Age: 28
End: 2023-08-02
Payer: COMMERCIAL

## 2023-08-02 VITALS
HEIGHT: 68 IN | WEIGHT: 188 LBS | HEART RATE: 83 BPM | SYSTOLIC BLOOD PRESSURE: 108 MMHG | DIASTOLIC BLOOD PRESSURE: 76 MMHG | OXYGEN SATURATION: 98 % | RESPIRATION RATE: 17 BRPM | BODY MASS INDEX: 28.49 KG/M2

## 2023-08-02 DIAGNOSIS — R06.83 SNORING: ICD-10-CM

## 2023-08-02 DIAGNOSIS — L03.90 CELLULITIS, UNSPECIFIED CELLULITIS SITE: ICD-10-CM

## 2023-08-02 DIAGNOSIS — F41.1 GENERALIZED ANXIETY DISORDER: ICD-10-CM

## 2023-08-02 DIAGNOSIS — M25.542 ARTHRALGIA OF BOTH HANDS: ICD-10-CM

## 2023-08-02 DIAGNOSIS — R53.83 OTHER FATIGUE: ICD-10-CM

## 2023-08-02 DIAGNOSIS — R06.02 SHORTNESS OF BREATH: ICD-10-CM

## 2023-08-02 DIAGNOSIS — K76.0 NON-ALCOHOLIC FATTY LIVER DISEASE: ICD-10-CM

## 2023-08-02 DIAGNOSIS — M25.541 ARTHRALGIA OF BOTH HANDS: ICD-10-CM

## 2023-08-02 DIAGNOSIS — Z82.49 FAMILY HISTORY OF BRAIN ANEURYSM: ICD-10-CM

## 2023-08-02 DIAGNOSIS — R53.83 OTHER FATIGUE: Primary | ICD-10-CM

## 2023-08-02 LAB
ALBUMIN SERPL-MCNC: 4.6 G/DL (ref 3.4–5)
ALBUMIN/GLOB SERPL: 1.5 {RATIO} (ref 1–2)
ALP LIVER SERPL-CCNC: 90 U/L
ALT SERPL-CCNC: 121 U/L
ANION GAP SERPL CALC-SCNC: 6 MMOL/L (ref 0–18)
AST SERPL-CCNC: 48 U/L (ref 15–37)
BASOPHILS # BLD AUTO: 0.03 X10(3) UL (ref 0–0.2)
BASOPHILS NFR BLD AUTO: 0.4 %
BILIRUB SERPL-MCNC: 0.4 MG/DL (ref 0.1–2)
BUN BLD-MCNC: 14 MG/DL (ref 7–18)
BUN/CREAT SERPL: 13.7 (ref 10–20)
CALCIUM BLD-MCNC: 9.2 MG/DL (ref 8.5–10.1)
CHLORIDE SERPL-SCNC: 107 MMOL/L (ref 98–112)
CO2 SERPL-SCNC: 28 MMOL/L (ref 21–32)
CREAT BLD-MCNC: 1.02 MG/DL
CRP SERPL-MCNC: <0.29 MG/DL (ref ?–0.3)
DEPRECATED RDW RBC AUTO: 40.9 FL (ref 35.1–46.3)
EGFRCR SERPLBLD CKD-EPI 2021: 104 ML/MIN/1.73M2 (ref 60–?)
EOSINOPHIL # BLD AUTO: 0.14 X10(3) UL (ref 0–0.7)
EOSINOPHIL NFR BLD AUTO: 1.8 %
ERYTHROCYTE [DISTWIDTH] IN BLOOD BY AUTOMATED COUNT: 12.2 % (ref 11–15)
ERYTHROCYTE [SEDIMENTATION RATE] IN BLOOD: 1 MM/HR
FASTING STATUS PATIENT QL REPORTED: NO
GLOBULIN PLAS-MCNC: 3.1 G/DL (ref 2.8–4.4)
GLUCOSE BLD-MCNC: 85 MG/DL (ref 70–99)
HCT VFR BLD AUTO: 47 %
HGB BLD-MCNC: 15.8 G/DL
IMM GRANULOCYTES # BLD AUTO: 0.02 X10(3) UL (ref 0–1)
IMM GRANULOCYTES NFR BLD: 0.3 %
IMMUNOGLOBULIN PNL SER-MCNC: 793 MG/DL (ref 791–1643)
LYMPHOCYTES # BLD AUTO: 2.54 X10(3) UL (ref 1–4)
LYMPHOCYTES NFR BLD AUTO: 32.6 %
MCH RBC QN AUTO: 30.6 PG (ref 26–34)
MCHC RBC AUTO-ENTMCNC: 33.6 G/DL (ref 31–37)
MCV RBC AUTO: 90.9 FL
MONOCYTES # BLD AUTO: 0.77 X10(3) UL (ref 0.1–1)
MONOCYTES NFR BLD AUTO: 9.9 %
NEUTROPHILS # BLD AUTO: 4.29 X10 (3) UL (ref 1.5–7.7)
NEUTROPHILS # BLD AUTO: 4.29 X10(3) UL (ref 1.5–7.7)
NEUTROPHILS NFR BLD AUTO: 55 %
NT-PROBNP SERPL-MCNC: 15 PG/ML (ref ?–125)
OSMOLALITY SERPL CALC.SUM OF ELEC: 292 MOSM/KG (ref 275–295)
PLATELET # BLD AUTO: 206 10(3)UL (ref 150–450)
POTASSIUM SERPL-SCNC: 4.3 MMOL/L (ref 3.5–5.1)
PROT SERPL-MCNC: 7.7 G/DL (ref 6.4–8.2)
RBC # BLD AUTO: 5.17 X10(6)UL
RHEUMATOID FACT SERPL-ACNC: <10 IU/ML (ref ?–15)
SODIUM SERPL-SCNC: 141 MMOL/L (ref 136–145)
TSI SER-ACNC: 2.62 MIU/ML (ref 0.36–3.74)
URATE SERPL-MCNC: 6.4 MG/DL
WBC # BLD AUTO: 7.8 X10(3) UL (ref 4–11)

## 2023-08-02 PROCEDURE — 86225 DNA ANTIBODY NATIVE: CPT | Performed by: FAMILY MEDICINE

## 2023-08-02 PROCEDURE — 86431 RHEUMATOID FACTOR QUANT: CPT | Performed by: FAMILY MEDICINE

## 2023-08-02 PROCEDURE — 83880 ASSAY OF NATRIURETIC PEPTIDE: CPT | Performed by: FAMILY MEDICINE

## 2023-08-02 PROCEDURE — 99215 OFFICE O/P EST HI 40 MIN: CPT | Performed by: FAMILY MEDICINE

## 2023-08-02 PROCEDURE — 82103 ALPHA-1-ANTITRYPSIN TOTAL: CPT | Performed by: FAMILY MEDICINE

## 2023-08-02 PROCEDURE — 82390 ASSAY OF CERULOPLASMIN: CPT | Performed by: FAMILY MEDICINE

## 2023-08-02 PROCEDURE — 84550 ASSAY OF BLOOD/URIC ACID: CPT | Performed by: FAMILY MEDICINE

## 2023-08-02 PROCEDURE — 82784 ASSAY IGA/IGD/IGG/IGM EACH: CPT | Performed by: FAMILY MEDICINE

## 2023-08-02 PROCEDURE — 85652 RBC SED RATE AUTOMATED: CPT | Performed by: FAMILY MEDICINE

## 2023-08-02 PROCEDURE — 71046 X-RAY EXAM CHEST 2 VIEWS: CPT | Performed by: FAMILY MEDICINE

## 2023-08-02 PROCEDURE — 86334 IMMUNOFIX E-PHORESIS SERUM: CPT | Performed by: FAMILY MEDICINE

## 2023-08-02 PROCEDURE — 80050 GENERAL HEALTH PANEL: CPT | Performed by: FAMILY MEDICINE

## 2023-08-02 PROCEDURE — 83521 IG LIGHT CHAINS FREE EACH: CPT | Performed by: FAMILY MEDICINE

## 2023-08-02 PROCEDURE — 3078F DIAST BP <80 MM HG: CPT | Performed by: FAMILY MEDICINE

## 2023-08-02 PROCEDURE — 86038 ANTINUCLEAR ANTIBODIES: CPT | Performed by: FAMILY MEDICINE

## 2023-08-02 PROCEDURE — 3074F SYST BP LT 130 MM HG: CPT | Performed by: FAMILY MEDICINE

## 2023-08-02 PROCEDURE — 84165 PROTEIN E-PHORESIS SERUM: CPT | Performed by: FAMILY MEDICINE

## 2023-08-02 PROCEDURE — 83516 IMMUNOASSAY NONANTIBODY: CPT | Performed by: FAMILY MEDICINE

## 2023-08-02 PROCEDURE — 86376 MICROSOMAL ANTIBODY EACH: CPT | Performed by: FAMILY MEDICINE

## 2023-08-02 PROCEDURE — 86140 C-REACTIVE PROTEIN: CPT | Performed by: FAMILY MEDICINE

## 2023-08-02 PROCEDURE — 3008F BODY MASS INDEX DOCD: CPT | Performed by: FAMILY MEDICINE

## 2023-08-02 NOTE — ASSESSMENT & PLAN NOTE
Healthy diet and exercise advised again. He reports that he does not have time for further blood work at this time  Liver enzymes have trended downward slightly but are still quite elevated. Will repeat CMP, also check SPEP, YELITZA, ceruloplasmin, alpha 1 antitrypsin, actin antibody, liver kidney microsomal antibody, mitochondrial antibody, IgG.

## 2023-08-02 NOTE — PATIENT INSTRUCTIONS
PATIENT INSTRUCTIONS    Thank you for seeing me today, it was a pleasure taking care of you. Please check out at the  and schedule a follow up appointment.   Return in about 1 month (around 9/2/2023) for physical .  Get chest x-ray   Get blood work drawn - I will go over your blood test results with you in 1 month unless it is urgent   Need to get anxiety under control - my suspicion is this is affecting a lot of how you feel  Consider medication or therapy   Can take advil as needed for pain  See pulmonology for sleep evaluation   Monitor acid reflux - this can also affecting your breathing - can try over the counter omeprazole    Lino,  Dr. Poly Singh

## 2023-08-02 NOTE — ASSESSMENT & PLAN NOTE
Patient with shortness of breath that has recently been getting worse  He notes difficulty running, sometimes difficulty breathing when eating. He is not having any chest pain or palpitations  I will order blood tests including CBC, TSH, proBNP to evaluate  Check a chest x-ray. Some of this may be anxiety related  Can also consider acid reflux. Can also consider getting pulmonary function testing and EKG moving forward. Follow-up in 1 month to reassess.

## 2023-08-02 NOTE — ASSESSMENT & PLAN NOTE
Patient reports that his hands feel swollen and he has arthralgia in his fingers when flexing. Advise using Advil as needed for pain  Can check YELITZA screen for autoimmune conditions given that he also has a history of elevated liver enzymes.   Can also check ESR, CRP, RF.

## 2023-08-02 NOTE — ASSESSMENT & PLAN NOTE
Patient with ongoing fatigue for the last 2-1/2 years  He does note that he works significant amount of hours  Also with snoring history and anxiety  I will check blood work again for him including CBC, CMP, TSH  Referral to pulmonology for evaluation of sleep apnea. Needs to get anxiety under better control as well.

## 2023-08-02 NOTE — ASSESSMENT & PLAN NOTE
Patient with ongoing anxiety-I suspect that this is contributing to many of his current symptoms. Does have some trauma in life, but does not meet criteria for PTSD. Has been advised therapy in the past-he continues to not be interested  Marian that he may benefit from being on antianxiety medication-SSRI  He will consider this.   Follow-up in 1 months to reassess

## 2023-08-03 LAB
A1AT SERPL-MCNC: 117 MG/DL (ref 90–200)
ALBUMIN SERPL ELPH-MCNC: 4.91 G/DL (ref 3.75–5.21)
ALBUMIN/GLOB SERPL: 2.05 {RATIO} (ref 1–2)
ALPHA1 GLOB SERPL ELPH-MCNC: 0.26 G/DL (ref 0.19–0.46)
ALPHA2 GLOB SERPL ELPH-MCNC: 0.56 G/DL (ref 0.48–1.05)
B-GLOBULIN SERPL ELPH-MCNC: 0.8 G/DL (ref 0.68–1.23)
CERULOPLASMIN SERPL-MCNC: 20.7 MG/DL (ref 20–60)
DSDNA IGG SERPL IA-ACNC: <0.6 IU/ML
ENA AB SER QL IA: 0.2 UG/L
ENA AB SER QL IA: NEGATIVE
GAMMA GLOB SERPL ELPH-MCNC: 0.78 G/DL (ref 0.62–1.7)
KAPPA LC FREE SER-MCNC: 1.21 MG/DL (ref 0.33–1.94)
KAPPA LC FREE/LAMBDA FREE SER NEPH: 1.11 {RATIO} (ref 0.26–1.65)
LAMBDA LC FREE SERPL-MCNC: 1.09 MG/DL (ref 0.57–2.63)
PROT SERPL-MCNC: 7.3 G/DL (ref 6.4–8.2)

## 2023-08-04 LAB
ACTIN SMOOTH MUSCLE AB: 13 UNITS
LIVER-KIDNEY MICRO AB: 1.6 UNITS
M2 MITOCHONDRIAL AB: <20 UNITS

## 2023-08-07 DIAGNOSIS — K76.0 NON-ALCOHOLIC FATTY LIVER DISEASE: ICD-10-CM

## 2023-08-07 DIAGNOSIS — R06.02 SHORTNESS OF BREATH: Primary | ICD-10-CM

## 2024-06-19 ENCOUNTER — TELEPHONE (OUTPATIENT)
Dept: PHYSICAL THERAPY | Facility: HOSPITAL | Age: 29
End: 2024-06-19

## 2024-06-19 ENCOUNTER — TELEPHONE (OUTPATIENT)
Dept: PHYSICAL THERAPY | Age: 29
End: 2024-06-19

## 2024-06-19 ENCOUNTER — OFFICE VISIT (OUTPATIENT)
Dept: SURGERY | Facility: CLINIC | Age: 29
End: 2024-06-19

## 2024-06-19 ENCOUNTER — TELEPHONE (OUTPATIENT)
Dept: SURGERY | Facility: CLINIC | Age: 29
End: 2024-06-19

## 2024-06-19 VITALS
DIASTOLIC BLOOD PRESSURE: 81 MMHG | WEIGHT: 191.81 LBS | HEART RATE: 100 BPM | SYSTOLIC BLOOD PRESSURE: 138 MMHG | HEIGHT: 67 IN | BODY MASS INDEX: 30.1 KG/M2

## 2024-06-19 DIAGNOSIS — M51.27 HERNIATED NUCLEUS PULPOSUS, L5-S1, LEFT: Primary | ICD-10-CM

## 2024-06-19 DIAGNOSIS — R20.2 NUMBNESS AND TINGLING OF LEFT LEG: ICD-10-CM

## 2024-06-19 DIAGNOSIS — M54.17 LEFT LUMBOSACRAL RADICULOPATHY: ICD-10-CM

## 2024-06-19 DIAGNOSIS — R20.0 NUMBNESS AND TINGLING OF LEFT LEG: ICD-10-CM

## 2024-06-19 PROCEDURE — 99205 OFFICE O/P NEW HI 60 MIN: CPT | Performed by: NEUROLOGICAL SURGERY

## 2024-06-19 RX ORDER — IBUPROFEN 800 MG/1
TABLET ORAL
COMMUNITY
Start: 2024-05-13

## 2024-06-19 RX ORDER — PREDNISONE 50 MG/1
50 TABLET ORAL DAILY
COMMUNITY
Start: 2024-06-05

## 2024-06-19 RX ORDER — NAPROXEN 500 MG/1
500 TABLET ORAL EVERY 12 HOURS PRN
COMMUNITY
Start: 2024-06-05

## 2024-06-19 NOTE — PATIENT INSTRUCTIONS
You are scheduled for Left Lumbar 5 to Sacral 1 Minimally Invasive Microdiscectomy on 7/11/24 with Dr. Busby at [facility].    PCP clearance is needed within 30 days of surgery.  We have faxed a request for pre-op clearance to your primary care physician Dr. Perez. Please contact their office for appointment.  Please schedule this appointment AT LEAST 1 WEEK PRIOR TO SURGERY DATE.  Your PCP may order additional testing or clearance from another specialist.   You will have an appointment with our RN Spine Navigator prior to surgery.  This is an educational telehealth/phone visit in which you will receive more information on the specifics of your surgery, instructions for before surgery, what to expect in the hospital and how to take care of yourself after surgery.  Your surgeon wants you to to participate in this visit so that you are as prepared for surgery as possible and have an opportunity to ask questions.  If possible, we would like your care partner to be present for the visit as well.    You will need to contact the Pre-admission department at [phone]. The Pre-Admission nurse will review your health history and give you information for day-of instructions. The nurse will also get you scheduled for all your pre-op testing required for your surgery.   You will need pre-operative labs which will include blood work and a MRSA/MSSA test (nasal swab). You will need for fast for the blood work. You may also need an EKG and/or chest x-ray depending on your current health status.    Do not take any blood thinning medications such as over the counter NSAIDS (advil, aleve, ibuprofen etc.), herbal supplements (garlic,tumeric etc.), vitamin E, fish oil or krill oil for at least 7 days prior to surgery.  If you have questions about your other medications, please call our office or send a MessageMe message.   You should have nothing to eat or drink after 11:00pm the night prior to surgery except for the  following:  Do drink 12 ounces of regular Gatorade (NOT RED) 12 hours and 4 hours prior to your scheduled surgery time. Do not drink any other liquids (including water) before your surgery. Take 1000 mg of Tylenol (Acetaminophen) 4 hours before your scheduled surgery time, take this with your scheduled Gatorade.  In order to prevent infection, you will need to purchase Hibiclens soap and use it after your regular body soap for 5 days prior to your procedure.  The last shower should be the night before surgery.  This soap can be found at any pharmacy in the first aid section. See detailed instructions below.    Our office will get prior authorization for surgery through your insurance.   Surgery is usually scheduled as 1 day admission.  This is an estimate and varies from person to person.  Ultimately, the surgeon will determine when you are ready to be discharged.  The hospital will contact you 1-2 days before surgery with your arrival time.       If you require FMLA or disability paperwork for your recovery, please make sure to either drop it off or have it faxed to our office at [phone]. Be sure the form has your name and date of birth on it.  The form will be faxed to our Forms Department and they will complete it for you.  There is a 25$ fee for all forms that need to be filled out.  Please be aware there is a 10-14 day turnaround time.  You will need to sign a release of information (SILVER) form if your paperwork does not come with one.  You may call the Forms Department with any questions at [phone].  Their fax number is [phone].     POST OP CARE--First Two Weeks  No bending at waist, twisting, pushing or pulling  No lifting more than 10 lb (a gallon of milk)  Wear cervical collar/lumbar brace, if prescribed, as instructed by surgeon  No overhead reaching  No driving until approved by your surgeon   Change positions frequently. No prolonged sitting or standing.  Increase walking as tolerated to  avoid blood clots  No NSAIDs until approved by surgeon (ibuprofen, aleve, advil, meloxicam, Celebrex, diclofenac etc).   Remove any bandage on post op day 3.  Leave incision open to air.  Glue will fall off on its own.  If you have staples or sutures that are not dissolvable, these will be removed at your 2 or 3 week post op visit.   Check your incision for signs of infection daily (redness, warmth, drainage, increased pain at incision site, fever)  Do not shower until post op day 3.  Do not allow water pressure to directly hit the incision.  Do not scrub the incision and avoid any lotion, creams or perfume near the incision site.  No swimming, hot tubs or baths until your incision is completely healed.  Take pain medication as prescribed, if needed.  Constipation is a common side effect of opioids.  If you experience constipation, drink plenty of water and take over-the-counter stool softeners daily.   Avoid nicotine and alcohol   When to call the office:  Increased or change in location of pain  Increased weakness in arms or legs  Incision drainage, redness or warmth  Fever  Bowel or bladder changes   Choking on food or liquids (cervical)  Pain, redness, swelling, color changes or warmth in lower leg    Hibiclens Bathing  Hibiclens is a body soap that is used before surgery to protect you from getting an infection post-operatively  Hibiclens comes in a large blue bottle and can be found in most pharmacies in the First Aid supplies  Shower with this daily for FIVE consecutive days before surgery, using the entire bottle over the five days.  The last shower should be the night before surgery.   Steps to bathing with Hibiclens  Do not use Hibiclens on your hair, face or private areas  Wash your hair and body as normal with your usual cleansers  Rinse well  Using a clean wet washcloth apply enough Hibiclens to cover your body. Wash from the neck down avoiding the genital areas and concentrating on the surgical  area  Rinse well  Dry yourself with a clean, dry towel  Do not use any powders, creams, lotions or sprays on your body as these attract bacteria  Deodorant, hand lotion and facial creams are acceptable.

## 2024-06-19 NOTE — PROGRESS NOTES
Neurosurgery Clinic Visit  2024    Tre Mckinley PCP:  Hernan Perez MD    1995 MRN [id number]       CHIEF COMPLAINT:  Chief Complaint   Patient presents with    Consult       HISTORY OF PRESENT ILLNESS:  Tre Mckinley is a(n) 29 year old male who presents today for neurosurgical consultation.    He has a primary complaint of left sided back and upper buttock pain, with radiation to the leg.  He is accompanied by his mother.    He sustained an injury at work on 2024.  He works as a printer.  He slipped on some grease, and fell approximately 3 feet.  He developed the immediate onset of left-sided back pain.    About 3 weeks ago, the pain worsened, and he developed some numbness into his leg.  The pain radiates to the posterior aspect of the leg, and the numbness and tingling more to the anterior aspect.  He has numbness and tingling in his left toes when he lies down.  Symptoms are exacerbated by Valsalva maneuver.    He also has some intermittent numbness and tingling in his testicles, but denies any saddle anesthesia.  He has had several episodes of urinary urgency, and a small episode of incontinence last week.  This has not recurred since.    His pain now rates 8/10.  His leg collapsed several times last week.  He has been out of work since then.    REVIEW OF SYSTEMS:  The 12 point checklist was reviewed and was negative except: As noted in HPI    ALLERGIES:  No Known Allergies    MEDICATIONS:  Current Outpatient Medications   Medication Sig Dispense Refill    naproxen 500 MG Oral Tab Take 1 tablet (500 mg total) by mouth every 12 (twelve) hours as needed.      predniSONE 50 MG Oral Tab Take 1 tablet (50 mg total) by mouth daily. FOR 5 DAYS      ibuprofen 800 MG Oral Tab TAKE 1 TABLET BY MOUTH EVERY 8 HOURS AS NEEDED FOR PAIN OR SWELLING         HISTORY:  Past Medical History:    Bursitis    COVID    Generalized anxiety disorder    Non-alcoholic fatty liver disease      Past Surgical History:   Procedure Laterality Date    Lasdeana       Family History   Problem Relation Age of Onset    Diabetes Mother         Biological mother    Anxiety Mother     Asthma Mother     Arthritis Mother         Back    Psoriasis Father     Other (smoking) Father     Anxiety Sister     Psoriasis Sister     No Known Problems Maternal Grandmother     No Known Problems Maternal Grandfather     No Known Problems Paternal Grandmother     No Known Problems Paternal Grandfather     Other (Brain aneurysm) Paternal Aunt     Colon Cancer Neg     Prostate Cancer Neg      Tre  reports that he has never smoked. He has never used smokeless tobacco. He reports that he does not currently use alcohol. He reports current drug use. Drug: Cannabis.    PHYSICAL EXAMINATION:  Vital Signs:  /81 (BP Location: Left arm, Patient Position: Sitting, Cuff Size: adult)   Pulse 100   Ht 67\"   Wt 191 lb 12.8 oz (87 kg)   BMI 30.04 kg/m²   On examination, strength is 5/5 throughout with the exception of left quadriceps, which is pain limited.  Left dorsiflexion and plantarflexion grade 4/5.  Reflexes are 1+ and symmetric.  No Ev's or clonus.    REVIEW OF STUDIES:  I personally reviewed the imaging of an MRI scan of the lumbar spine performed at an outside facility on May 21, 2024.  This demonstrates a large left-sided disc extrusion at L5-S1.      ASSESSMENT AND PLAN:  1.  Left L5-S1 disc herniation with intractable radiculopathy.    His pain is now excruciating, and is getting worse.  I am also particularly concerned that he has some weakness in his left leg, and has fallen several times over the past week.    Additionally, the patient had an episode of incontinence last week.  This could indicate an impending cauda equina syndrome.    I talked to him about available treatment options.  At this point, surgical intervention is reasonably indicated.  This will consist of a left L5-S1 minimally invasive  microdiscectomy.    The patient is getting  next week, and would like to have the operation after he returns from his honeymoon.  I counseled him regarding the risk of developing cauda equina syndrome.  If he develops any of those symptoms, he needs to proceed to the emergency department immediately, whether he is here or on his honeymoon.  He understands this.    The operation will be scheduled for Thursday, July 11.    His symptoms also worsened after the last MRI scan was done.  Therefore, I would like to obtain a new MRI scan of the lumbar spine.  I would like to see him back here in the office on Wednesday, July 10, to review the new imaging, and check on his symptoms.    Out of work note given, to cover from now, until 2 weeks postoperatively.  The patient reports that he does have a light duty option at work, which will involve standing and pressing buttons.  I anticipate he may be able to go back to that within 2 or 3 weeks postoperatively.  I would also anticipate the need for a postoperative work conditioning program with physical therapy, which will significantly increase the possibility that he can return to full duties without sustaining an additional injury.    I reviewed the imaging with the patient and his mother, as well as my findings and recommendations.  All questions were answered.      Time spent on counseling/coordination of care:  30 Minutes    Total Time spent with patient:  30 Minutes        6/19/2024  11:56 AM

## 2024-06-19 NOTE — TELEPHONE ENCOUNTER
Received [employer]JAN [employer] ([employer]  total 12 pages (front /Back) emailing forms to Forms dept and  will send via inter-office envelope to Forms department

## 2024-06-19 NOTE — PROGRESS NOTES
New patient:  Reason for visit: New patient presents today for a second opinion to discuss Lumbar herniated disc. Patient had a work accident 2 month ago. He fell at work. He states he has bilateral lower back pain and cannot bend forward. He has  left burning pain in leg and numbness when waking up and has to wait 30 min to regain feeling. Patient is taking Ibuprofen.     Estimated time of onset:  2 month(s)    Numeric Rating Scale: (No Pain) 0  to  10 (Worst Pain)        Pain at Present:  8/10  with medication but without the pain a 10/10                                                                                                        Distribution of Pain:    bilateral    Past Treatments for Current Pain Condition:   None but is taking medications.     Augustina neurotropas vitaminado -Patient was taking but has stopped.      Prior diagnostic testing related to this condition:  MRI Lumbar completed on 05/21/2024 at [facility].

## 2024-06-19 NOTE — TELEPHONE ENCOUNTER
Patient brought imaging disc and report from [facility] to his appointment with Dr. Busby.     05/21/2024 - MRI Lumbar Spine WO Contrast    Film uploaded to PACS.   Disc and report returned to the patient.   Copy of report sent to scanning department.

## 2024-06-20 ENCOUNTER — APPOINTMENT (OUTPATIENT)
Dept: PHYSICAL THERAPY | Age: 29
End: 2024-06-20
Payer: COMMERCIAL

## 2024-06-24 ENCOUNTER — HOSPITAL ENCOUNTER (OUTPATIENT)
Dept: MRI IMAGING | Facility: HOSPITAL | Age: 29
Discharge: HOME | End: 2024-06-24

## 2024-06-24 ENCOUNTER — TELEPHONE (OUTPATIENT)
Dept: SURGERY | Facility: CLINIC | Age: 29
End: 2024-06-24

## 2024-06-24 ENCOUNTER — TELEPHONE (OUTPATIENT)
Dept: INTERNAL MEDICINE CLINIC | Facility: CLINIC | Age: 29
End: 2024-06-24

## 2024-06-24 ENCOUNTER — APPOINTMENT (OUTPATIENT)
Dept: PHYSICAL THERAPY | Age: 29
End: 2024-06-24
Payer: COMMERCIAL

## 2024-06-24 DIAGNOSIS — M51.37 DDD (DEGENERATIVE DISC DISEASE), LUMBOSACRAL: ICD-10-CM

## 2024-06-24 DIAGNOSIS — R20.0 LEFT LEG NUMBNESS: ICD-10-CM

## 2024-06-24 DIAGNOSIS — R29.898 LEFT LEG WEAKNESS: ICD-10-CM

## 2024-06-24 DIAGNOSIS — M51.27 LUMBOSACRAL DISC HERNIATION: ICD-10-CM

## 2024-06-24 DIAGNOSIS — M51.27 HERNIATED NUCLEUS PULPOSUS, L5-S1, LEFT: ICD-10-CM

## 2024-06-24 DIAGNOSIS — M54.17 LUMBOSACRAL RADICULOPATHY: Primary | ICD-10-CM

## 2024-06-24 PROCEDURE — 72158 MRI LUMBAR SPINE W/O & W/DYE: CPT

## 2024-06-24 PROCEDURE — A9575 INJ GADOTERATE MEGLUMI 0.1ML: HCPCS

## 2024-06-24 RX ORDER — GADOTERATE MEGLUMINE 376.9 MG/ML
20 INJECTION INTRAVENOUS
Status: COMPLETED | OUTPATIENT
Start: 2024-06-24 | End: 2024-06-24

## 2024-06-24 RX ADMIN — GADOTERATE MEGLUMINE 18 ML: 376.9 INJECTION INTRAVENOUS at 17:59:00

## 2024-06-24 NOTE — TELEPHONE ENCOUNTER
Called [employer] at [phone].  Left message to call back regarding claim#[id number].

## 2024-06-24 NOTE — TELEPHONE ENCOUNTER
Pt called stating his  is requesting recent office visit notes as, work status as well as details regarding his surgery including surgery date.  [employer] Ortiz  [phone] Fax: [phone]

## 2024-06-24 NOTE — TELEPHONE ENCOUNTER
Please call have patient see me sooner than his currently scheduled time for a preoperative evaluation so that I can get all the labs back in time. ThanksHernan

## 2024-06-24 NOTE — TELEPHONE ENCOUNTER
Patient is scheduled for LEFT LUMBAR 5 - SACRAL 1 MINIMALLY INVASIVE MICRODISCECTOMY on 7/11/24 with Dr. Busby at [facility]     Y pre-op apt scheduled (if sx is more than 30 days from last apt) - pt has upcoming appt to discuss new MRI  Y pre-op apt scheduled with RN spine navigator  Y Surgical instructions reviewed by nursing staff with patient  N/A  form completed  Y Surgery order signed   Y Placed sx on surgery sheet  Y Placed on outlook calendar  Y [employer] message sent to patient with sx instructions  Y Faxed pre-op clearance request to PCP Dr. Perez   N/A Faxed letter to prescribing provider requesting anticoagulants be held for surgery  N/A E-mail sent to [facility]  Y Post-op appointments made  Y PA Workers Comp. Routed to PA team to initiate.  N/A Post-Op outreach pt reminder placed.   Y Entire Neurosurgery Checklist Completed    Clearances: pending  PA: pending   CPT Codes: 77761

## 2024-06-24 NOTE — TELEPHONE ENCOUNTER
Family Medical Leave Act received, sent [employer] message for Release of Information / Form Completion Request completion

## 2024-06-25 ENCOUNTER — TELEPHONE (OUTPATIENT)
Dept: PHYSICAL THERAPY | Facility: HOSPITAL | Age: 29
End: 2024-06-25

## 2024-06-25 ENCOUNTER — LAB ENCOUNTER (OUTPATIENT)
Dept: LAB | Facility: HOSPITAL | Age: 29
End: 2024-06-25
Attending: FAMILY MEDICINE

## 2024-06-25 DIAGNOSIS — Z01.818 PREOPERATIVE EXAMINATION: ICD-10-CM

## 2024-06-25 LAB
ALBUMIN SERPL-MCNC: 5 G/DL (ref 3.2–4.8)
ALBUMIN/GLOB SERPL: 2.2 {RATIO} (ref 1–2)
ALP LIVER SERPL-CCNC: 91 U/L
ALT SERPL-CCNC: 104 U/L
ANION GAP SERPL CALC-SCNC: 4 MMOL/L (ref 0–18)
ANTIBODY SCREEN: NEGATIVE
APTT PPP: 30.4 SECONDS (ref 23–36)
AST SERPL-CCNC: 48 U/L (ref ?–34)
BASOPHILS # BLD AUTO: 0.04 X10(3) UL (ref 0–0.2)
BASOPHILS NFR BLD AUTO: 0.6 %
BILIRUB SERPL-MCNC: 0.4 MG/DL (ref 0.3–1.2)
BUN BLD-MCNC: 10 MG/DL (ref 9–23)
BUN/CREAT SERPL: 10.8 (ref 10–20)
CALCIUM BLD-MCNC: 10.3 MG/DL (ref 8.7–10.4)
CHLORIDE SERPL-SCNC: 108 MMOL/L (ref 98–112)
CO2 SERPL-SCNC: 31 MMOL/L (ref 21–32)
CREAT BLD-MCNC: 0.93 MG/DL
DEPRECATED RDW RBC AUTO: 39.2 FL (ref 35.1–46.3)
EGFRCR SERPLBLD CKD-EPI 2021: 115 ML/MIN/1.73M2 (ref 60–?)
EOSINOPHIL # BLD AUTO: 0.12 X10(3) UL (ref 0–0.7)
EOSINOPHIL NFR BLD AUTO: 1.9 %
ERYTHROCYTE [DISTWIDTH] IN BLOOD BY AUTOMATED COUNT: 12 % (ref 11–15)
FASTING STATUS PATIENT QL REPORTED: YES
GLOBULIN PLAS-MCNC: 2.3 G/DL (ref 2–3.5)
GLUCOSE BLD-MCNC: 108 MG/DL (ref 70–99)
HCT VFR BLD AUTO: 47.8 %
HGB BLD-MCNC: 16.8 G/DL
IMM GRANULOCYTES # BLD AUTO: 0.02 X10(3) UL (ref 0–1)
IMM GRANULOCYTES NFR BLD: 0.3 %
INR BLD: 0.85 (ref 0.8–1.2)
LYMPHOCYTES # BLD AUTO: 2.14 X10(3) UL (ref 1–4)
LYMPHOCYTES NFR BLD AUTO: 33.2 %
MCH RBC QN AUTO: 31.4 PG (ref 26–34)
MCHC RBC AUTO-ENTMCNC: 35.1 G/DL (ref 31–37)
MCV RBC AUTO: 89.3 FL
MONOCYTES # BLD AUTO: 0.66 X10(3) UL (ref 0.1–1)
MONOCYTES NFR BLD AUTO: 10.2 %
NEUTROPHILS # BLD AUTO: 3.47 X10 (3) UL (ref 1.5–7.7)
NEUTROPHILS # BLD AUTO: 3.47 X10(3) UL (ref 1.5–7.7)
NEUTROPHILS NFR BLD AUTO: 53.8 %
OSMOLALITY SERPL CALC.SUM OF ELEC: 296 MOSM/KG (ref 275–295)
PLATELET # BLD AUTO: 191 10(3)UL (ref 150–450)
POTASSIUM SERPL-SCNC: 4.4 MMOL/L (ref 3.5–5.1)
PROT SERPL-MCNC: 7.3 G/DL (ref 5.7–8.2)
PROTHROMBIN TIME: 12.2 SECONDS (ref 11.6–14.8)
RBC # BLD AUTO: 5.35 X10(6)UL
RH BLOOD TYPE: NEGATIVE
SODIUM SERPL-SCNC: 143 MMOL/L (ref 136–145)
WBC # BLD AUTO: 6.5 X10(3) UL (ref 4–11)

## 2024-06-25 PROCEDURE — 85025 COMPLETE CBC W/AUTO DIFF WBC: CPT | Performed by: FAMILY MEDICINE

## 2024-06-25 PROCEDURE — 80053 COMPREHEN METABOLIC PANEL: CPT | Performed by: FAMILY MEDICINE

## 2024-06-25 PROCEDURE — 86900 BLOOD TYPING SEROLOGIC ABO: CPT | Performed by: FAMILY MEDICINE

## 2024-06-25 PROCEDURE — 93005 ELECTROCARDIOGRAM TRACING: CPT

## 2024-06-25 PROCEDURE — 85730 THROMBOPLASTIN TIME PARTIAL: CPT | Performed by: FAMILY MEDICINE

## 2024-06-25 PROCEDURE — 86901 BLOOD TYPING SEROLOGIC RH(D): CPT | Performed by: FAMILY MEDICINE

## 2024-06-25 PROCEDURE — 85610 PROTHROMBIN TIME: CPT | Performed by: FAMILY MEDICINE

## 2024-06-25 PROCEDURE — 93010 ELECTROCARDIOGRAM REPORT: CPT | Performed by: INTERNAL MEDICINE

## 2024-06-25 PROCEDURE — 87081 CULTURE SCREEN ONLY: CPT | Performed by: FAMILY MEDICINE

## 2024-06-25 PROCEDURE — 86850 RBC ANTIBODY SCREEN: CPT | Performed by: FAMILY MEDICINE

## 2024-06-26 LAB
ATRIAL RATE: 75 BPM
P AXIS: 55 DEGREES
P-R INTERVAL: 136 MS
Q-T INTERVAL: 368 MS
QRS DURATION: 102 MS
QTC CALCULATION (BEZET): 410 MS
R AXIS: 61 DEGREES
T AXIS: 31 DEGREES
VENTRICULAR RATE: 75 BPM

## 2024-06-26 NOTE — TELEPHONE ENCOUNTER
Received voice mail from Arik.  He is now the  for this case.  His phone number is [phone].    He said the surgery case will be approved.  Asked that I re-fax clinicals to his fax at [phone].  Confirmation received.     All invoices for the surgery should be mailed to [address] [place] [address]    He will be out of the office today at noon.  Will fax authorization to me when he returns.

## 2024-06-28 ENCOUNTER — NURSE ONLY (OUTPATIENT)
Dept: SURGERY | Facility: CLINIC | Age: 29
End: 2024-06-28

## 2024-06-28 DIAGNOSIS — Z71.9 ENCOUNTER FOR EDUCATION: Primary | ICD-10-CM

## 2024-06-28 NOTE — PROGRESS NOTES
RN Spine Navigator Education for Tre     If you have received instructions from your surgeon that are different from those listed below, please follow your physician's instructions.    You are scheduled for a Microdiscectomy with Dr. Busby on 7/11.      Patient Surgical Goals: Decreased pain in back and legs    Before Your Surgery    Choose a Care Partner  Patient attended spine navigator visit with care partner.  Care partner is Stephanie. Your care partner(s) should be able to provide transportation to and from the hospital. They should be able to help at home for the first week after discharge, including helping you with meals, medication, and dressing changes.    Clearance Before Surgery  You will need to see your primary care provider within 30 days before surgery. Please make sure this appointment is at least a week before surgery as more testing or doctor visits may be ordered. Presurgical testing may include labs, nasal swab, imaging, EKG.   Make sure that you complete all preadmission testing so that surgery does not get delayed.    Home Environment  Assessed home status: bedroom and bathroom are on first floor, patient has dependents at home, and patient has pets. Suggested arrangements for childcare  and pet care.  It is recommended that you prepare your home by putting clean sheets on your bed, freezing meals, and putting frequently used items at waist level.   Prevent falls by removing items that could cause you to trip, adding nightlights and adding a nonskid mat in shower.   Assistive devices can be purchased at a medical supply store or online including canes, walkers, toileting devices (commodes, raised toilet seats, toilet paper wiping aid), long handled sponge, shower chair or tub transfer bench, grabber/reacher tool, sock aid, long handled shoehorn, if needed.      Tobacco, Nicotine and Marijuana Use   Not applicable    Medications to Stop   For 7-10 days before surgery do not take any blood  thinning medications. This includes non-steroidal anti-inflammatories or NSAIDs (Advil, Aleve, Motrin, ibuprofen, naproxen, meloxicam, diclofenac, celecoxib, etc.), aspirin (unless told otherwise by your cardiologist or surgeon), herbal supplements and vitamins (garlic, turmeric, vitamin E, fish oil or krill oil, etc.). You may only take Tylenol or prescribed narcotic medication if needed for pain.   Other medications to stop include: none    Leading Up to Day of Surgery  Five days before surgery wash with Hibiclens soap after your regular body soap every day. Do not put use Hibiclens on your face, hair or privates. Your last shower should be the night before surgery.  One business day before surgery, the preadmission testing staff will call you and let you know what time to arrive, where to park and will provide any additional instructions.   After 11pm the day before surgery, do not eat or drink anything (including water, gum, or candies) except for Tylenol and Gatorade.   Drink 12 ounces of regular Gatorade (NOT RED) 12 hours prior to your scheduled surgery time. Drink your second 12 ounces of regular Gatorade and take 1000 mg of Tylenol (acetaminophen) 4 hours before your scheduled surgery time.     Items to Bring to the Hospital   Bring insurance card, ID, advanced directive, or medical power of  paperwork, loose fitting clothing, shoes with a back that can accommodate swollen feet, long phone charging cord.   Do not bring jewelry, valuables, or medications.     In the Hospital     Operative Day and Hospital Stay  In the preoperative area, you will change into a gown, have an IV placed in your hand or arm by the nurse, and sign any consent paperwork that is needed for your procedure. You will speak to the surgeon and anesthesiologist. It is important to tell the doctors and nurses if you have had any significant side effects from pain medications or anesthesia such as a rash or severe nausea.    In the  operating room, the anesthesiologist will attach monitors, give you oxygen through a mask, and give you medicine through the IV to fall asleep. After you are sleeping, the breathing tube will be placed. The equipment will be hooked up and removed while you are asleep. You will wake up on the stretcher.     During the surgery, your care partner can sit in the surgical waiting area. There are TV screens in that area that keep them informed of your progress.     In the recovery room, monitors will be attached that check your heart rate, blood pressure and oxygen levels. While you may not remember this part, a nurse is with you and constantly checking on your condition. Medications for pain and nausea will be given if you need them. Your family is not allowed in the recovery room. When you are ready to leave the recovery room, you will go to the same day surgery discharge area. Your family may join you there as you continue to wake up. You will be offered something to eat and drink and be given pain pills if needed. You will get your discharge instructions from the nurse and go home from there OR you will be transported on your stretcher to the inpatient unit accompanied by your family once a room is available.  On the inpatient unit, a team of doctors and advanced practice providers will manage your care. The spine care nurses will check your blood pressure, temperature, heartbeat, breathing, stomach sounds and your incision. They may assess the strength and sensation in your arms and legs. Medications that are given in the hospital include antibiotics, IV fluids, pain medications, muscle relaxers, stool softeners, and your home medications. You may get blood drawn and another x-ray. Physical and occupational therapists may come to your room to teach you how to move around safely after surgery.     Post Op Plan   The average length of hospital stay is zero-one day.    Preventing surgical complications  It is important  to follow all instructions before and after surgery to decrease the risks of surgery and prevent complications.     Blood clots: walk, wear leg compression devices in the hospital, and do ankle pumps at home  Infection: wash with Hibiclens before surgery, wash your hands, don't touch your incision  Pneumonia: take deep breaths and cough and use the breathing exercise (incentive spirometer)   Nausea/vomiting: start with liquids and small meals and do not take pills on an empty stomach  Constipation: drink water and walk frequently    Tell your nurse if you are experiencing nausea, vomiting or constipation as they have medications to help treat these.      At home     Understanding Pain After Surgery  We will do our best to manage your pain after surgery, but it is not possible to be completely pain-free. There will be operative pain in your back or neck. Pain in the arms or legs may be one of the first things to improve. Numbness, weakness, and tingling should improve over time. However, there can be a temporary increase in symptoms in the first few days due to inflammation from surgery.   Pain medications will be prescribed to take home at discharge. The goal of pain medicine after surgery is to make your pain tolerable, not to make you pain free. We encourage you to use the medication prescribed to you after your surgery, but please take the lowest possible dose to manage your pain. Taking high doses of narcotics can cause side effects. Transition to plain Tylenol when your pain improves. You may get more continuous pain relief by alternating between medications if you have multiple instead of taking them at the same time. Write down when you have taken a medication as it may be difficult to remember after a few doses.    Post operative medication   Tylenol (acetaminophen): take for pain. Do not take more than 3000 mg - 4000 mg in 24 hours because it can damage your liver.   Narcotics: take for moderate to severe  pain. Do not drink alcohol or drive while taking narcotics. Some narcotic medications (Norco, Tylenol #3, Percocet, Vicodin) contain Tylenol (acetaminophen). Make sure to not exceed the maximum dose if you are taking additional Tylenol with these medications.  Muscle relaxers: take for muscle cramping. These can cause drowsiness.    NSAIDS (Advil, Aleve, Motrin, ibuprofen, naproxen, meloxicam, diclofenac, celecoxib, etc.) or aspirin: Do not take these unless your physician says it is ok.  Stool softeners: take to prevent constipation while you are taking narcotic medications. You can get these over the counter at the pharmacy. You may use laxatives, which are stronger, if needed.    If you believe you will need more of medication your surgeon has prescribed to you, request a refill through your pharmacy or through the refill request in [facility] (log in, go to medications, then select refill request) at least two business days before you run out of medication.     Nonpharmacologic pain management   You may use ice on your incision for 20 minutes every hour to help with pain and swelling. Do not place ice directly on your skin. You can use heat to sooth your muscles but avoid placing heat directly on your incision. Make frequent position changes. You can do gentle stretching while avoiding significant bending or twisting. Use deep breathing techniques and distractions such as TV, music, reading, or games.     Movement restrictions  After surgery, no bending or twisting your neck or back. Do not lift anything over 10lbs (about the weight of a gallon of milk) or lift anything over your shoulders. Avoid pulling or pushing. You may use stairs while holding the handrail.  It is ok to ride in the car but refrain from driving or traveling long distances until cleared to do so by your surgeon. You may not drive while taking narcotics or muscle relaxants.    Post Op Exercise   Walk frequently. Start with walking short distances  and increase as you start to feel better. Do ankle pumps (bending at your ankles, bring your feet towards your head then point them towards the ground) 15-20 times every hour when awake to help prevent blood clots.     Your surgeon will let you know at your post operative appointment if you are ready to decrease your movement restrictions and increase your exercise. If you have questions in between appointments about lessening your restrictions, please contact the office.     You and your doctor will discuss how you are feeling as you heal and decide if outpatient physical therapy or a medical fitness referral is needed.    Caring for your incision  Always wash your hands before touching your incision. Your incision will be closed with sutures under the skin and skin glue or Steri-Strips (thin white bandages) on top of the skin. Do not attempt to peal off Skin glue/Steri-Strips as they will come off on their own. If the incision is closed with sutures or staples on top of the skin, they will be removed at a post op appointment. The incision may be covered with a gauze dressing that can come off after three days. Once the original gauze dressing is removed, we prefer that you leave your incision open to air (without a gauze dressing). If the incision has drainage or is rubbing against your clothes, you may place gauze and medical tape over it. Change the gauze and medical tape daily. Look at your incision daily to check for signs of infection.  You can shower three days after your surgery or sooner if your surgeon allows. We recommend the care partner be present during the first shower for safety. Let soapy water run over the incision, but do not scrub it or spray it directly. Gently pat it dry after with a clean towel. Do not apply any creams or lotions to the incision. Do not soak in a tub, pool, or any body of water until your incision is fully healed.    Signs of Infection   Check your incision daily for  swelling, redness, drainage, pus, bad smell, or opening of the incision.     When to Call for Assistance  Call the Neurosurgery Office ([phone] Option #2) if you experience signs of infection, opening of the incision, continuous nausea or vomiting, poor pain control despite using the pain medication as directed, a sudden increase in pain, temperature over 101F, difficulty swallowing, leg swelling, or with any concerns, unanswered questions, or new problems, such as new numbness/weakness/tingling.  Call 911 or go to the nearest emergency room if you experience chest pain, difficulty breathing, loss of bowel or bladder control, extreme drowsiness, or any other life-threatening situation.    Follow-up Plan   Appointments with Dr. Busby or Ale  at 3, 6 and 12 weeks    Answered questions regarding: Speak to Dr. Busby regarding time off of work.      You can contact the RN Spine Navigator at [phone] or [email or web address] with additional questions or feedback on your care. It may take several business days to receive a reply so please do not call the RN spine navigator for refills or for emergencies.    Spine navigator spent 27 minutes conducting a virtual visit to provide education. Thank you for letting the RN Spine Navigator participate in your care.    Harleen COPPOLA RN
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
## 2024-07-02 NOTE — TELEPHONE ENCOUNTER
Called Arik at [phone].  Left message for him to call back.      I wanted to confirm that he received the clinicals that I faxed.  Also, checking on the approval letter that he said he was going to fax to me last week.  Didn't receive this from him?

## 2024-07-08 ENCOUNTER — OFFICE VISIT (OUTPATIENT)
Dept: INTERNAL MEDICINE CLINIC | Facility: CLINIC | Age: 29
End: 2024-07-08
Payer: COMMERCIAL

## 2024-07-08 VITALS
DIASTOLIC BLOOD PRESSURE: 84 MMHG | WEIGHT: 193 LBS | OXYGEN SATURATION: 98 % | BODY MASS INDEX: 30.29 KG/M2 | HEIGHT: 67 IN | SYSTOLIC BLOOD PRESSURE: 132 MMHG | TEMPERATURE: 98 F | HEART RATE: 88 BPM

## 2024-07-08 DIAGNOSIS — M25.541 ARTHRALGIA OF BOTH HANDS: ICD-10-CM

## 2024-07-08 DIAGNOSIS — M79.672 HEEL PAIN, CHRONIC, LEFT: ICD-10-CM

## 2024-07-08 DIAGNOSIS — R06.83 SNORING: ICD-10-CM

## 2024-07-08 DIAGNOSIS — L98.9 SCALP LESION: ICD-10-CM

## 2024-07-08 DIAGNOSIS — R20.2 PARESTHESIA: ICD-10-CM

## 2024-07-08 DIAGNOSIS — G89.29 HEEL PAIN, CHRONIC, LEFT: ICD-10-CM

## 2024-07-08 DIAGNOSIS — E66.09 CLASS 1 OBESITY DUE TO EXCESS CALORIES WITHOUT SERIOUS COMORBIDITY WITH BODY MASS INDEX (BMI) OF 30.0 TO 30.9 IN ADULT: ICD-10-CM

## 2024-07-08 DIAGNOSIS — F41.1 GENERALIZED ANXIETY DISORDER: ICD-10-CM

## 2024-07-08 DIAGNOSIS — M25.542 ARTHRALGIA OF BOTH HANDS: ICD-10-CM

## 2024-07-08 DIAGNOSIS — M51.26 LUMBAR HERNIATED DISC: ICD-10-CM

## 2024-07-08 DIAGNOSIS — K76.0 NON-ALCOHOLIC FATTY LIVER DISEASE: ICD-10-CM

## 2024-07-08 DIAGNOSIS — R51.9 NONINTRACTABLE HEADACHE, UNSPECIFIED CHRONICITY PATTERN, UNSPECIFIED HEADACHE TYPE: Primary | ICD-10-CM

## 2024-07-08 DIAGNOSIS — R53.83 OTHER FATIGUE: ICD-10-CM

## 2024-07-08 DIAGNOSIS — Z82.49 FAMILY HISTORY OF BRAIN ANEURYSM: ICD-10-CM

## 2024-07-08 DIAGNOSIS — K59.00 CONSTIPATION, UNSPECIFIED CONSTIPATION TYPE: ICD-10-CM

## 2024-07-08 PROBLEM — R06.02 SHORTNESS OF BREATH: Status: RESOLVED | Noted: 2023-08-02 | Resolved: 2024-07-08

## 2024-07-08 PROBLEM — E66.811 CLASS 1 OBESITY DUE TO EXCESS CALORIES WITHOUT SERIOUS COMORBIDITY WITH BODY MASS INDEX (BMI) OF 30.0 TO 30.9 IN ADULT: Status: ACTIVE | Noted: 2021-12-09

## 2024-07-08 NOTE — ASSESSMENT & PLAN NOTE
Will need MRA head or CTA of head with IV contrast moving forward given family history  Will plan to order after surgery in the future   For now, discussed with patient that will have him get through his surgery first.

## 2024-07-08 NOTE — ASSESSMENT & PLAN NOTE
Patient reports persistent bothersome paresthesias symptoms and discomfort in the right upper back.  He describes this as numbness with also pain to the touch.  He notes that the numbness comes and goes.  Has been referred to neurology previously, never went  Consider follow up after surgery

## 2024-07-08 NOTE — ASSESSMENT & PLAN NOTE
Weight loss discussed again.  Focus on healthy diet and eventual exercise when able.  Liver enzymes have trended downward slightly but are still quite elevated.  Has been referred to GI in the past for monitoring.

## 2024-07-08 NOTE — PATIENT INSTRUCTIONS
PATIENT INSTRUCTIONS    Thank you for seeing me today, it was a pleasure taking care of you.  Please check out at the  and schedule a follow up appointment.  Return in about 6 months (around 1/8/2025) for physical .  Please remember that the usha period for all appointments is 5 minutes. This is to help maximize the amount of time that we can spend together at our visits.    Please focus on a healthy diet  Follow up closely with neurosurgery recommendations  Moving forward, will eventually benefit from get scan of head  Consider management of anxiety     Lino,  Dr. Perez

## 2024-07-08 NOTE — TELEPHONE ENCOUNTER
Prior authorization request completed for: Left Lumbar 5 to Sacral 1 Minimally Invasive Microdiscectomy    Authorization #[id number]  Authorization dates: 07/11/24  CPT codes approved: 41407  Number of visits/dates of service approved: Outpatient   Physician: Kehinde   Location: [facility]     Call Ref#: [id number]  Representative Name: Arik [phone]  Insurance Carrier: Annetta Turner     Per Arik mail all invoices to:    [id number]  [place], [place] [address]

## 2024-07-08 NOTE — ASSESSMENT & PLAN NOTE
Patient with ongoing anxiety  Does have some trauma in life, but does not meet criteria for PTSD.  Has been advised therapy in the past-he continues to not be interested.  Discussed that he may benefit from being on antianxiety medication-not interested.  Follow-up as needed

## 2024-07-10 ENCOUNTER — OFFICE VISIT (OUTPATIENT)
Dept: SURGERY | Facility: CLINIC | Age: 29
End: 2024-07-10
Payer: COMMERCIAL

## 2024-07-10 VITALS
WEIGHT: 190 LBS | HEART RATE: 72 BPM | SYSTOLIC BLOOD PRESSURE: 137 MMHG | DIASTOLIC BLOOD PRESSURE: 89 MMHG | BODY MASS INDEX: 29.82 KG/M2 | HEIGHT: 67 IN

## 2024-07-10 DIAGNOSIS — M51.27 LUMBOSACRAL DISC HERNIATION: Primary | ICD-10-CM

## 2024-07-10 PROCEDURE — 99213 OFFICE O/P EST LOW 20 MIN: CPT | Performed by: NEUROLOGICAL SURGERY

## 2024-07-10 PROCEDURE — 3075F SYST BP GE 130 - 139MM HG: CPT | Performed by: NEUROLOGICAL SURGERY

## 2024-07-10 PROCEDURE — 3079F DIAST BP 80-89 MM HG: CPT | Performed by: NEUROLOGICAL SURGERY

## 2024-07-10 PROCEDURE — 3008F BODY MASS INDEX DOCD: CPT | Performed by: NEUROLOGICAL SURGERY

## 2024-07-10 NOTE — PROGRESS NOTES
Neurosurgery Clinic Visit  7/10/2024    Tre Mckinley PCP:  Hernan Perez MD    1995 MRN [id number]     HISTORY OF PRESENT ILLNESS:  Tre Mckinley is a(n) 29 year old male who presents today for preoperative evaluation.  He was last seen several weeks ago.    He continues to have excruciating leg pain.  This is unchanged.  No bowel or bladder symptoms.      PHYSICAL EXAMINATION:  Vital Signs:  /89   Pulse 72   Ht 67\"   Wt 190 lb (86.2 kg)   BMI 29.76 kg/m²   On examination, strength is 5/5, although there is significant pain limitation in the left lower extremity.      REVIEW OF STUDIES:    Repeat MRI of the lumbar spine was performed .  Images personally reviewed.  This appears very similar to the prior study.  There is a large left L5-S1 disc extrusion.      ASSESSMENT and PLAN:  Left L5-S1 disc herniation.  The patient is scheduled for operation tomorrow.  Risks and benefits were reviewed, as well as the expected postoperative course.    The patient's job is very physically demanding, and he is not sure if they will be accommodating with light duty restrictions.  Therefore, he may need to stay out of work for a more extended period of time postoperatively.        Time spent on counseling/coordination of care:  15 Minutes    Total time spent with patient:  15 Minutes        7/10/2024  10:06 AM     This report was dictated using voice recognition technology.  Errors in syntax or recognition may occur, and should not be construed to change the meaning of a sentence.

## 2024-07-10 NOTE — H&P (VIEW-ONLY)
Neurosurgery Clinic Visit  7/10/2024    Tre Mckinley PCP:  Hernan Perez MD    1995 MRN [id number]     HISTORY OF PRESENT ILLNESS:  Tre Mckinley is a(n) 29 year old male who presents today for preoperative evaluation.  He was last seen several weeks ago.    He continues to have excruciating leg pain.  This is unchanged.  No bowel or bladder symptoms.      PHYSICAL EXAMINATION:  Vital Signs:  /89   Pulse 72   Ht 67\"   Wt 190 lb (86.2 kg)   BMI 29.76 kg/m²   On examination, strength is 5/5, although there is significant pain limitation in the left lower extremity.      REVIEW OF STUDIES:    Repeat MRI of the lumbar spine was performed .  Images personally reviewed.  This appears very similar to the prior study.  There is a large left L5-S1 disc extrusion.      ASSESSMENT and PLAN:  Left L5-S1 disc herniation.  The patient is scheduled for operation tomorrow.  Risks and benefits were reviewed, as well as the expected postoperative course.    The patient's job is very physically demanding, and he is not sure if they will be accommodating with light duty restrictions.  Therefore, he may need to stay out of work for a more extended period of time postoperatively.        Time spent on counseling/coordination of care:  15 Minutes    Total time spent with patient:  15 Minutes        7/10/2024  10:06 AM     This report was dictated using voice recognition technology.  Errors in syntax or recognition may occur, and should not be construed to change the meaning of a sentence.

## 2024-07-11 ENCOUNTER — APPOINTMENT (OUTPATIENT)
Dept: GENERAL RADIOLOGY | Facility: HOSPITAL | Age: 29
End: 2024-07-11
Attending: NEUROLOGICAL SURGERY
Payer: OTHER MISCELLANEOUS

## 2024-07-11 ENCOUNTER — ANESTHESIA EVENT (OUTPATIENT)
Dept: SURGERY | Facility: HOSPITAL | Age: 29
End: 2024-07-11
Payer: OTHER MISCELLANEOUS

## 2024-07-11 ENCOUNTER — ANESTHESIA (OUTPATIENT)
Dept: SURGERY | Facility: HOSPITAL | Age: 29
End: 2024-07-11
Payer: OTHER MISCELLANEOUS

## 2024-07-11 ENCOUNTER — HOSPITAL ENCOUNTER (OUTPATIENT)
Facility: HOSPITAL | Age: 29
Discharge: HOME | End: 2024-07-12
Attending: NEUROLOGICAL SURGERY | Admitting: NEUROLOGICAL SURGERY
Payer: OTHER MISCELLANEOUS

## 2024-07-11 DIAGNOSIS — Z98.890 S/P LUMBAR MICRODISCECTOMY: Primary | ICD-10-CM

## 2024-07-11 LAB — RH BLOOD TYPE: NEGATIVE

## 2024-07-11 PROCEDURE — 0ST40ZZ RESECTION OF LUMBOSACRAL DISC, OPEN APPROACH: ICD-10-PCS | Performed by: NEUROLOGICAL SURGERY

## 2024-07-11 PROCEDURE — 00NY0ZZ RELEASE LUMBAR SPINAL CORD, OPEN APPROACH: ICD-10-PCS | Performed by: NEUROLOGICAL SURGERY

## 2024-07-11 PROCEDURE — 01NB0ZZ RELEASE LUMBAR NERVE, OPEN APPROACH: ICD-10-PCS | Performed by: NEUROLOGICAL SURGERY

## 2024-07-11 PROCEDURE — 3E0R33Z INTRODUCTION OF ANTI-INFLAMMATORY INTO SPINAL CANAL, PERCUTANEOUS APPROACH: ICD-10-PCS | Performed by: NEUROLOGICAL SURGERY

## 2024-07-11 PROCEDURE — 76000 FLUOROSCOPY <1 HR PHYS/QHP: CPT | Performed by: NEUROLOGICAL SURGERY

## 2024-07-11 RX ORDER — MORPHINE SULFATE 10 MG/ML
6 INJECTION, SOLUTION INTRAMUSCULAR; INTRAVENOUS EVERY 10 MIN PRN
Status: DISCONTINUED | OUTPATIENT
Start: 2024-07-11 | End: 2024-07-11 | Stop reason: HOSPADM

## 2024-07-11 RX ORDER — ACETAMINOPHEN 10 MG/ML
1000 INJECTION, SOLUTION INTRAVENOUS ONCE
Status: COMPLETED | OUTPATIENT
Start: 2024-07-11 | End: 2024-07-11

## 2024-07-11 RX ORDER — HYDROMORPHONE HYDROCHLORIDE 1 MG/ML
0.4 INJECTION, SOLUTION INTRAMUSCULAR; INTRAVENOUS; SUBCUTANEOUS EVERY 2 HOUR PRN
Status: DISCONTINUED | OUTPATIENT
Start: 2024-07-11 | End: 2024-07-12

## 2024-07-11 RX ORDER — HYDROMORPHONE HYDROCHLORIDE 1 MG/ML
0.2 INJECTION, SOLUTION INTRAMUSCULAR; INTRAVENOUS; SUBCUTANEOUS EVERY 5 MIN PRN
Status: DISCONTINUED | OUTPATIENT
Start: 2024-07-11 | End: 2024-07-11 | Stop reason: HOSPADM

## 2024-07-11 RX ORDER — DOCUSATE SODIUM 100 MG/1
100 CAPSULE, LIQUID FILLED ORAL 2 TIMES DAILY
Status: DISCONTINUED | OUTPATIENT
Start: 2024-07-11 | End: 2024-07-12

## 2024-07-11 RX ORDER — OXYCODONE HYDROCHLORIDE 5 MG/1
5 TABLET ORAL EVERY 4 HOURS PRN
Qty: 30 TABLET | Refills: 0 | Status: SHIPPED | OUTPATIENT
Start: 2024-07-11

## 2024-07-11 RX ORDER — DEXAMETHASONE SODIUM PHOSPHATE 4 MG/ML
VIAL (ML) INJECTION AS NEEDED
Status: DISCONTINUED | OUTPATIENT
Start: 2024-07-11 | End: 2024-07-11 | Stop reason: SURG

## 2024-07-11 RX ORDER — SODIUM CHLORIDE, SODIUM LACTATE, POTASSIUM CHLORIDE, CALCIUM CHLORIDE 600; 310; 30; 20 MG/100ML; MG/100ML; MG/100ML; MG/100ML
INJECTION, SOLUTION INTRAVENOUS CONTINUOUS
Status: DISCONTINUED | OUTPATIENT
Start: 2024-07-11 | End: 2024-07-12

## 2024-07-11 RX ORDER — DIAZEPAM 5 MG/ML
5 INJECTION, SOLUTION INTRAMUSCULAR; INTRAVENOUS ONCE
Status: COMPLETED | OUTPATIENT
Start: 2024-07-11 | End: 2024-07-11

## 2024-07-11 RX ORDER — ROCURONIUM BROMIDE 10 MG/ML
INJECTION, SOLUTION INTRAVENOUS AS NEEDED
Status: DISCONTINUED | OUTPATIENT
Start: 2024-07-11 | End: 2024-07-11 | Stop reason: SURG

## 2024-07-11 RX ORDER — BUPIVACAINE HYDROCHLORIDE 2.5 MG/ML
INJECTION, SOLUTION EPIDURAL; INFILTRATION; INTRACAUDAL AS NEEDED
Status: DISCONTINUED | OUTPATIENT
Start: 2024-07-11 | End: 2024-07-11 | Stop reason: HOSPADM

## 2024-07-11 RX ORDER — METHOCARBAMOL 500 MG/1
500 TABLET, FILM COATED ORAL 3 TIMES DAILY
Qty: 60 TABLET | Refills: 0 | Status: SHIPPED | OUTPATIENT
Start: 2024-07-11

## 2024-07-11 RX ORDER — MORPHINE SULFATE 4 MG/ML
4 INJECTION, SOLUTION INTRAMUSCULAR; INTRAVENOUS EVERY 10 MIN PRN
Status: DISCONTINUED | OUTPATIENT
Start: 2024-07-11 | End: 2024-07-11 | Stop reason: HOSPADM

## 2024-07-11 RX ORDER — DIPHENHYDRAMINE HCL 25 MG
25 CAPSULE ORAL EVERY 4 HOURS PRN
Status: DISCONTINUED | OUTPATIENT
Start: 2024-07-11 | End: 2024-07-12

## 2024-07-11 RX ORDER — ACETAMINOPHEN 10 MG/ML
1000 INJECTION, SOLUTION INTRAVENOUS EVERY 6 HOURS
Status: DISCONTINUED | OUTPATIENT
Start: 2024-07-11 | End: 2024-07-12

## 2024-07-11 RX ORDER — MORPHINE SULFATE 4 MG/ML
2 INJECTION, SOLUTION INTRAMUSCULAR; INTRAVENOUS EVERY 10 MIN PRN
Status: DISCONTINUED | OUTPATIENT
Start: 2024-07-11 | End: 2024-07-11 | Stop reason: HOSPADM

## 2024-07-11 RX ORDER — DIPHENHYDRAMINE HYDROCHLORIDE 50 MG/ML
25 INJECTION INTRAMUSCULAR; INTRAVENOUS EVERY 4 HOURS PRN
Status: DISCONTINUED | OUTPATIENT
Start: 2024-07-11 | End: 2024-07-12

## 2024-07-11 RX ORDER — OXYCODONE HYDROCHLORIDE 5 MG/1
5 TABLET ORAL EVERY 4 HOURS PRN
Status: DISCONTINUED | OUTPATIENT
Start: 2024-07-11 | End: 2024-07-12

## 2024-07-11 RX ORDER — POLYETHYLENE GLYCOL 3350 17 G/17G
17 POWDER, FOR SOLUTION ORAL DAILY PRN
Status: DISCONTINUED | OUTPATIENT
Start: 2024-07-11 | End: 2024-07-12

## 2024-07-11 RX ORDER — DIAZEPAM 5 MG/1
10 TABLET ORAL EVERY 8 HOURS PRN
Status: DISCONTINUED | OUTPATIENT
Start: 2024-07-11 | End: 2024-07-12

## 2024-07-11 RX ORDER — LIDOCAINE HYDROCHLORIDE AND EPINEPHRINE 10; 10 MG/ML; UG/ML
INJECTION, SOLUTION INFILTRATION; PERINEURAL AS NEEDED
Status: DISCONTINUED | OUTPATIENT
Start: 2024-07-11 | End: 2024-07-11 | Stop reason: HOSPADM

## 2024-07-11 RX ORDER — HYDROMORPHONE HYDROCHLORIDE 1 MG/ML
0.4 INJECTION, SOLUTION INTRAMUSCULAR; INTRAVENOUS; SUBCUTANEOUS EVERY 5 MIN PRN
Status: DISCONTINUED | OUTPATIENT
Start: 2024-07-11 | End: 2024-07-11 | Stop reason: HOSPADM

## 2024-07-11 RX ORDER — METHYLPREDNISOLONE ACETATE 40 MG/ML
INJECTION, SUSPENSION INTRA-ARTICULAR; INTRALESIONAL; INTRAMUSCULAR; SOFT TISSUE AS NEEDED
Status: DISCONTINUED | OUTPATIENT
Start: 2024-07-11 | End: 2024-07-11 | Stop reason: HOSPADM

## 2024-07-11 RX ORDER — ONDANSETRON 2 MG/ML
4 INJECTION INTRAMUSCULAR; INTRAVENOUS EVERY 6 HOURS PRN
Status: DISCONTINUED | OUTPATIENT
Start: 2024-07-11 | End: 2024-07-12

## 2024-07-11 RX ORDER — METHOCARBAMOL 500 MG/1
500 TABLET, FILM COATED ORAL ONCE
Status: COMPLETED | OUTPATIENT
Start: 2024-07-11 | End: 2024-07-11

## 2024-07-11 RX ORDER — ACETAMINOPHEN 500 MG
500 TABLET ORAL EVERY 6 HOURS PRN
Qty: 120 TABLET | Refills: 0 | Status: SHIPPED | OUTPATIENT
Start: 2024-07-11 | End: 2024-07-12

## 2024-07-11 RX ORDER — SENNOSIDES 8.6 MG
17.2 TABLET ORAL NIGHTLY
Status: DISCONTINUED | OUTPATIENT
Start: 2024-07-11 | End: 2024-07-12

## 2024-07-11 RX ORDER — HYDROMORPHONE HYDROCHLORIDE 1 MG/ML
0.6 INJECTION, SOLUTION INTRAMUSCULAR; INTRAVENOUS; SUBCUTANEOUS EVERY 5 MIN PRN
Status: DISCONTINUED | OUTPATIENT
Start: 2024-07-11 | End: 2024-07-11 | Stop reason: HOSPADM

## 2024-07-11 RX ORDER — PROCHLORPERAZINE EDISYLATE 5 MG/ML
5 INJECTION INTRAMUSCULAR; INTRAVENOUS EVERY 8 HOURS PRN
Status: DISCONTINUED | OUTPATIENT
Start: 2024-07-11 | End: 2024-07-11 | Stop reason: HOSPADM

## 2024-07-11 RX ORDER — NALOXONE HYDROCHLORIDE 0.4 MG/ML
0.08 INJECTION, SOLUTION INTRAMUSCULAR; INTRAVENOUS; SUBCUTANEOUS AS NEEDED
Status: ACTIVE | OUTPATIENT
Start: 2024-07-11 | End: 2024-07-11

## 2024-07-11 RX ORDER — METHOCARBAMOL 500 MG/1
500 TABLET, FILM COATED ORAL 3 TIMES DAILY
Status: DISCONTINUED | OUTPATIENT
Start: 2024-07-11 | End: 2024-07-12

## 2024-07-11 RX ORDER — ONDANSETRON 2 MG/ML
INJECTION INTRAMUSCULAR; INTRAVENOUS AS NEEDED
Status: DISCONTINUED | OUTPATIENT
Start: 2024-07-11 | End: 2024-07-11 | Stop reason: SURG

## 2024-07-11 RX ORDER — ENEMA 19; 7 G/133ML; G/133ML
1 ENEMA RECTAL ONCE AS NEEDED
Status: DISCONTINUED | OUTPATIENT
Start: 2024-07-11 | End: 2024-07-12

## 2024-07-11 RX ORDER — HYDROMORPHONE HYDROCHLORIDE 1 MG/ML
0.8 INJECTION, SOLUTION INTRAMUSCULAR; INTRAVENOUS; SUBCUTANEOUS EVERY 2 HOUR PRN
Status: DISCONTINUED | OUTPATIENT
Start: 2024-07-11 | End: 2024-07-12

## 2024-07-11 RX ORDER — GLYCOPYRROLATE 0.2 MG/ML
INJECTION, SOLUTION INTRAMUSCULAR; INTRAVENOUS AS NEEDED
Status: DISCONTINUED | OUTPATIENT
Start: 2024-07-11 | End: 2024-07-11 | Stop reason: SURG

## 2024-07-11 RX ORDER — MIDAZOLAM HYDROCHLORIDE 1 MG/ML
INJECTION INTRAMUSCULAR; INTRAVENOUS AS NEEDED
Status: DISCONTINUED | OUTPATIENT
Start: 2024-07-11 | End: 2024-07-11 | Stop reason: SURG

## 2024-07-11 RX ORDER — OXYCODONE HYDROCHLORIDE 5 MG/1
10 TABLET ORAL EVERY 4 HOURS PRN
Status: DISCONTINUED | OUTPATIENT
Start: 2024-07-11 | End: 2024-07-12

## 2024-07-11 RX ORDER — ONDANSETRON 2 MG/ML
4 INJECTION INTRAMUSCULAR; INTRAVENOUS EVERY 6 HOURS PRN
Status: DISCONTINUED | OUTPATIENT
Start: 2024-07-11 | End: 2024-07-11 | Stop reason: HOSPADM

## 2024-07-11 RX ORDER — LIDOCAINE HYDROCHLORIDE 10 MG/ML
INJECTION, SOLUTION EPIDURAL; INFILTRATION; INTRACAUDAL; PERINEURAL AS NEEDED
Status: DISCONTINUED | OUTPATIENT
Start: 2024-07-11 | End: 2024-07-11 | Stop reason: SURG

## 2024-07-11 RX ORDER — BISACODYL 10 MG
10 SUPPOSITORY, RECTAL RECTAL
Status: DISCONTINUED | OUTPATIENT
Start: 2024-07-11 | End: 2024-07-12

## 2024-07-11 RX ADMIN — LIDOCAINE HYDROCHLORIDE 20 MG: 10 INJECTION, SOLUTION EPIDURAL; INFILTRATION; INTRACAUDAL; PERINEURAL at 07:41:00

## 2024-07-11 RX ADMIN — GLYCOPYRROLATE 0.2 MG: 0.2 INJECTION, SOLUTION INTRAMUSCULAR; INTRAVENOUS at 07:45:00

## 2024-07-11 RX ADMIN — ONDANSETRON 4 MG: 2 INJECTION INTRAMUSCULAR; INTRAVENOUS at 07:45:00

## 2024-07-11 RX ADMIN — SODIUM CHLORIDE, SODIUM LACTATE, POTASSIUM CHLORIDE, CALCIUM CHLORIDE: 600; 310; 30; 20 INJECTION, SOLUTION INTRAVENOUS at 09:26:00

## 2024-07-11 RX ADMIN — ROCURONIUM BROMIDE 40 MG: 10 INJECTION, SOLUTION INTRAVENOUS at 07:41:00

## 2024-07-11 RX ADMIN — MIDAZOLAM HYDROCHLORIDE 2 MG: 1 INJECTION INTRAMUSCULAR; INTRAVENOUS at 07:40:00

## 2024-07-11 RX ADMIN — DEXAMETHASONE SODIUM PHOSPHATE 4 MG: 4 MG/ML VIAL (ML) INJECTION at 07:45:00

## 2024-07-11 RX ADMIN — LIDOCAINE HYDROCHLORIDE 30 MG: 10 INJECTION, SOLUTION EPIDURAL; INFILTRATION; INTRACAUDAL; PERINEURAL at 07:40:00

## 2024-07-11 RX ADMIN — SODIUM CHLORIDE, SODIUM LACTATE, POTASSIUM CHLORIDE, CALCIUM CHLORIDE: 600; 310; 30; 20 INJECTION, SOLUTION INTRAVENOUS at 07:36:00

## 2024-07-11 NOTE — ANESTHESIA PREPROCEDURE EVALUATION
Anesthesia PreOp Note    HPI:     Tre Mckinley is a 29 year old male who presents for preoperative consultation requested by: Candido Busby MD    Date of Surgery: 7/11/2024    Procedure(s):  Left Lumbar  5 - Sacral 1 minimally invasive microdiscectomy  Indication: Lumbosacral radiculopathy [M54.17]  DDD (degenerative disc disease), lumbosacral [M51.37]  Lumbosacral disc herniation [M51.27]  Left leg weakness [R29.898]  Left leg numbness [R20.0]    Relevant Problems   No relevant active problems       NPO:  Last Liquid Consumption Date: 07/10/24  Last Liquid Consumption Time: 2000  Last Solid Consumption Date: 07/10/24  Last Solid Consumption Time: 2000  Last Liquid Consumption Date: 07/10/24          History Review:  Patient Active Problem List    Diagnosis Date Noted    Family history of brain aneurysm 08/02/2023    Snoring 08/02/2023    Paresthesia 08/15/2022    Heel pain, chronic, left 02/08/2022    Non-alcoholic fatty liver disease 12/09/2021    Health maintenance examination 12/09/2021    Generalized anxiety disorder 12/09/2021    Class 1 obesity due to excess calories without serious comorbidity with body mass index (BMI) of 30.0 to 30.9 in adult 12/09/2021    Lumbar herniated disc 12/09/2021       Past Medical History:    Back problem    Bursitis    COVID    Generalized anxiety disorder    Non-alcoholic fatty liver disease       Past Surgical History:   Procedure Laterality Date    Lasik         No medications prior to admission.     Current Facility-Administered Medications Ordered in Epic   Medication Dose Route Frequency Provider Last Rate Last Admin    lactated ringers infusion   Intravenous Continuous aCndido Bsuby MD        ceFAZolin (Ancef) 2g in 10mL IV syringe premix  2 g Intravenous Once Candido Busby MD         No current [facility]-ordered outpatient medications on file.       No Known Allergies    Family History   Problem Relation Age of Onset    Psoriasis  Father     Other (smoking) Father     Diabetes Mother         Biological mother    Anxiety Mother     Asthma Mother     Arthritis Mother         Back    No Known Problems Maternal Grandmother     No Known Problems Maternal Grandfather     No Known Problems Paternal Grandmother     No Known Problems Paternal Grandfather     Anxiety Sister     Psoriasis Sister     Other (Brain aneurysm) Paternal Aunt     Colon Cancer Neg     Prostate Cancer Neg      Social History     Socioeconomic History    Marital status: Single   Tobacco Use    Smoking status: Never    Smokeless tobacco: Never   Vaping Use    Vaping status: Never Used   Substance and Sexual Activity    Alcohol use: Not Currently    Drug use: Yes     Types: Cannabis     Comment: uses for sleep occasionally    Sexual activity: Yes     Partners: Female       Available pre-op labs reviewed.  Lab Results   Component Value Date    WBC 6.5 06/25/2024    RBC 5.35 06/25/2024    HGB 16.8 06/25/2024    HCT 47.8 06/25/2024    MCV 89.3 06/25/2024    MCH 31.4 06/25/2024    MCHC 35.1 06/25/2024    RDW 12.0 06/25/2024    .0 06/25/2024     Lab Results   Component Value Date     06/25/2024    K 4.4 06/25/2024     06/25/2024    CO2 31.0 06/25/2024    BUN 10 06/25/2024    CREATSERUM 0.93 06/25/2024     (H) 06/25/2024    CA 10.3 06/25/2024     Lab Results   Component Value Date    INR 0.85 06/25/2024       Vital Signs:  Body mass index is 29.76 kg/m².   height is 1.702 m (5' 7\") and weight is 86.2 kg (190 lb). His oral temperature is 98.2 °F (36.8 °C). His blood pressure is 136/84 and his pulse is 78. His respiration is 16 and oxygen saturation is 97%.   Vitals:    07/03/24 1019 07/11/24 0606   BP:  136/84   Pulse:  78   Resp:  16   Temp:  98.2 °F (36.8 °C)   TempSrc:  Oral   SpO2:  97%   Weight: 86.2 kg (190 lb) 86.2 kg (190 lb)   Height: 1.702 m (5' 7\")         Anesthesia Evaluation     Patient summary reviewed and Nursing notes reviewed    No history of  anesthetic complications   Airway   Mallampati: II  TM distance: >3 FB  Neck ROM: full  Dental - Dentition appears grossly intact     Pulmonary - negative ROS and normal exam   Cardiovascular - negative ROS and normal exam  Exercise tolerance: good    Neuro/Psych - negative ROS     GI/Hepatic/Renal - negative ROS     Endo/Other - negative ROS   Abdominal  - normal exam                 Anesthesia Plan:   ASA:  1  Plan:   General  Airway:  ETT  Post-op Pain Management: IV analgesics and Oral pain medication  Informed Consent Plan and Risks Discussed With:  Patient  Discussed plan with:  CRNA      I have informed Tre Mckinley and/or legal guardian or family member of the nature of the anesthetic plan, benefits, risks including possible dental damage if relevant, major complications, and any alternative forms of anesthetic management.   All of the patient's questions were answered to the best of my ability. The patient desires the anesthetic management as planned.  FRANSISCO HARRIS MD  7/11/2024 7:12 AM  Present on Admission:  **None**

## 2024-07-11 NOTE — OPERATIVE REPORT
Neurosurgery operative report        Preoperative diagnosis:  1.  Left L5-S1 disc herniation with intractable radiculopathy      Postoperative diagnosis:  Same      Procedure performed:  1.  Left L5 subtotal inferior hemilaminectomy  2.  Left S1 subtotal superior hemilaminectomy  3.  Left L5-S1 medial facetectomy  4.  Left L5-S1 minimally invasive microdiscectomy  5.  Use of operating microscope with surgical microdissection techniques   6.  Use of intraoperative fluoroscopy with live interpretation of intraoperative imaging  7.  Epidural steroid application, left L5-S1        Surgeon: Candido Busby MD        Assistant: Ale Pratt PA-C        Anesthesia: General endotracheal        Estimated blood loss: 10 cc        Indications for procedure:    Please also see the relevant progress notes for further information.  Briefly, this patient was evaluated in the outpatient office.  He presented with a left lumbosacral radiculopathy.  Imaging workup was reviewed.  This demonstrated a large left L5-S1 disc herniation.  The patient was counseled regarding available surgical and nonsurgical treatment options.  Following a discussion of risk and benefits, he chose to proceed with this operation.        Procedure in detail:    The patient was reevaluated in the preoperative holding area.  The patient was reexamined.  The operation was reviewed, including risks, benefits, and alternatives.  Informed consent was verified.    The patient was then brought back to the operating theater.  A timeout was performed per protocol.  General endotracheal anesthesia was induced.  Lines and monitors were started by the anesthesiologist.    The patient was placed in a prone position on Leland table.  All pressure points were carefully padded and checked.  The back was cleansed with isopropyl alcohol.  Reference marks made with a skin marker.  An 18-gauge spinal needle was placed.  Crosstable fluoroscopy was used to localize the  incision.  DuraPrep was now applied followed by standard sterile surgical draping.    Lidocaine with epinephrine was injected.  Another timeout was performed per protocol.    Left paramedian skin incision was made with a #15 scalpel blade.  Bovie electrocautery was used to maintain hemostasis.  A K wire was advanced through the fascia followed by sequential dilators under fluoroscopic guidance.  An 18 x 50 mm working port was selected.  This was secured to the rigid mounting arm.    At this point, the operating microscope was brought onto the field.  The operating microscope and surgical microdissection techniques were used for the balance of the operation until closure.    Soft tissue was cleared with a pituitary rongeur and bipolar electrocautery.  An up angled curette was used to undermine the trailing edge of the L5 lamina.  A series of Kerrison rongeurs were used to carry out a subtotal inferior hemilaminectomy at L5, subtotal superior hemilaminectomy at S1, and a medial facetectomy at L5-S1.  Ligamentum flavum was elevated and resected with a series of Kerrison rongeurs.    The thecal sac and left S1 nerve root were readily visualized.  These were gently mobilized medially with a Penfield 4 elevator.  There was a large subligamentous disc herniation.  This was entered with a pair of sharp microscissors.  A series of nerve hooks and pituitary micro rongeur were used to remove several large fragments of herniated disc material.  At the conclusion of this process, the thecal sac and nerve root were found to be well decompressed.    The incision was irrigated with saline solution.  Meticulous hemostasis was assured with bipolar electrocautery and Floseal as needed.  The retractor was now removed and muscular hemostasis was achieved with bipolar electrocautery.  Depo-Medrol 40 mg was placed in the dorsal epidural space.  Fascia was closed with a 0 Vicryl on a UR 6 needle in a figure-of-eight fashion.  Deep dermis  was closed with interrupted inverted 3-0 Vicryl's.  Skin was closed with a running 4-0 Vicryl subcuticular stitch.  Benzoin and Steri-Strips were applied.  A dressing was applied.  Drapes were removed.  The patient was now returned to a neutral, supine position.        Disposition: Postanesthesia care unit    Condition: Stable, extubated, and neurologically at baseline    Counts: All needle, sponge, and cottonoid counts were reported correct at the end of the operation.    Complications: None    Wound classification: 1, clean    Implants: None    Specimen: None        This report was dictated using voice recognition technology.  Errors in syntax or recognition may occur, and should not be construed to change the meaning of a sentence.

## 2024-07-11 NOTE — DISCHARGE INSTRUCTIONS
Minimally Invasive Lumbar Microdiscectomy/Laminectomy Discharge Instructions    Activity  Restrictions  No twisting, pulling, pushing or lifting > 10 lbs for the first month,  > 20 lbs for the second month, > 30 lbs for the third month.  No lifting anything over your head.    Sitting  Sit with your knees at about the same level as your hips; use a footstool if needed.  Do not sit in soft or overstuffed chairs. Firm chairs with straight backs give better support.   Recliners are OK but may need to add pillows in order to not sink into the chair.  Use a raised toilet seat if needed.  Change position every 20-30 minutes when sitting (example: after sitting, stand, then you can sit again for another 20-30 minutes).    Walking is your best exercise.  Listen to your body.  Walk several times a day  Smaller distances, but frequently are better.  Your goal is to increase the distance you walk each day.  Remember to listen to your body    Sex  After 2 weeks, when comfortable and approved by your surgeon.  Stop if causing pain.    Driving  Usually allowed after 1-2 weeks; so long as you're not under the influence of narcotics (check with physician at first office visit).  Do Not drive while taking narcotics or muscle relaxants.  Adhere to sitting restrictions.    Stairs  Climb stairs as needed    Incision site care and dressing  Changes as directed by your surgeon    YOUR INCISION IS CLOSED WITH ABSORBABLE SUTURES   May leave open to air. A clean 4x4 gauze may prevent clothing from rubbing incision.   Watch incision for any redness, drainage, increased warmth or opening of the incision. Call surgeon if you notice any of these.  No lotions or ointments on or near incision.  Always wash hands before and after touching incision.    Bathing  No tub baths, pools, or saunas for 6 weeks and until cleared by surgeon.  When able to shower, you may have water run down over the incision but do not scrub or pick off the glue.  After  showering, pat incision dry and may apply and allow for incision to dry thoroughly before covering it.  Do not apply any lotions or ointments to incision.     Return to work  When cleared by surgeon. Discuss specific work activities with your surgeon at follow up visit.  Light to sedentary work may be possible 2-3 weeks post op  Heavy work may be possible 6 weeks post op.    Sleeping  Use a firm mattress.  Lay on side or back, not stomach.  May use pillow under knees, between legs or behind back if lying on your side.    Diet and constipation prevention  Drink six to eight glasses liquid (water, juice) per day.  Eat a high fiber diet (bran, vegetables, fruit).  Use an over the counter stool softener such as Colace or senakot while taking narcotics to prevent constipation; use laxatives such as Miralax or Milk of Magnesia as needed.  An enema or suppository may be necessary if above measures do not work.    No smoking  Smoking will inhibit healing.  Even one cigarette a day will cause problems.  Chewing tobacco, nicotine gum or patches will also inhibit healing.    Pain Management  Relaxation techniques  A way to focus your attention other than on your pain. Your brain makes endorphins that are a natural body chemical that can decrease pain. Some examples of relaxation techniques are deep breathing, listening to music or meditating.  May use Cold therapy for 20 minutes multiple times per day.  Be sure there is a cloth barrier between skin and cold therapy.  Medication  Tylenol (acetaminophen) and Motrin (ibuprofen) are preferred. Caution if your pain med contains Tylenol (acetaminophen); maximum 3.5 gms of Tylenol (acetaminophen) in 24 hours. You can alternate between Tylenol and Motrin every 4 hrs.  A single aspirin (81 mg) for the heart is ok if ordered by your physician.  Take muscle relaxants and pain medications as prescribed allowing 30-45 minutes to take effect.  Do NOT take alcohol while on pain  medication.  Monitor need for pain medication closely  Call pharmacy or physician office at least five days before out of pain medication. If calling physician office after 3 pm, it will be handled on the next business day.    Post op office visit  Attend your follow up, already scheduled after surgery as directed at discharge  Schedule one week follow up with Primary Care Physician. Review all medications.    When to contact your surgeon  Temp > 101F; Take your temperature twice a day  Increased pain, swelling, redness, or any drainage to incision.  Separation of incision.  Sudden reappearance of pain that won’t go away with pain medication.  New numbness or weakness to arms or legs.  Difficulty urinating or having bowel movements  Headaches that worsen when standing and resolve when laying flat.    Go directly to the ER or CALL 911 if you:  become short of breath  have chest pain  cough up blood  have unexplained anxiety with breathing      HOME INSTRUCTIONS  [facility] HOME CARE INSTRUCTIONS: POST-OP ANESTHESIA  The medication that you received for sedation or general anesthesia can last up to 24 hours. Your judgment and reflexes may be altered, even if you feel like your normal self.      We Recommend:   Do not drive any motor vehicle or bicycle   Avoid mowing the lawn, playing sports, or working with power tools/applicances (power saws, electric knives or mixers)   That you have someone stay with you on your first night home   Do not drink alcohol or take sleeping pills or tranquilizers   Do not sign legal documents within 24 hours of your procedure   If you had a nerve block for your surgery, take extra care not to put any pressure on your arm or hand for 24 hours    It is normal:  For you to have a sore throat if you had a breathing tube during surgery (while you were asleep!). The sore throat should get better within 48 hours. You can gargle with warm salt water (1/2 tsp in 4 oz warm water) or use a throat  lozenge for comfort  To feel muscle aches or soreness especially in the abdomen, chest or neck. The achy feeling should go away in the next 24 hours  To feel weak, sleepy or \"wiped out\". Your should start feeling better in the next 24 hours.   To experience mild discomforts such as sore lip or tongue, headache, cramps, gas pains or a bloated feeling in your abdomen.   To experience mild back pain or soreness for a day or two if you had spinal or epidural anesthesia.   If you had laparoscopic surgery, to feel shoulder pain or discomfort on the day of surgery.   For some patients to have nausea after surgery/anesthesia    If you feel nausea or experience vomiting:   Try to move around less.   Eat less than usual or drink only liquids until the next morning   Nausea should resolve in about 24 hours    If you have a problem when you are at home:    Call your surgeons office   Discharge Instructions: After Your Surgery  You’ve just had surgery. During surgery, you were given medicine called anesthesia to keep you relaxed and free of pain. After surgery, you may have some pain or nausea. This is common. Here are some tips for feeling better and getting well after surgery.   Going home  Your healthcare provider will show you how to take care of yourself when you go home. They'll also answer your questions. Have an adult family member or friend drive you home. For the first 24 hours after your surgery:   Don't drive or use heavy equipment.  Don't make important decisions or sign legal papers.  Take medicines as directed.  Don't drink alcohol.  Have someone stay with you, if needed. They can watch for problems and help keep you safe.  Be sure to go to all follow-up visits with your healthcare provider. And rest after your surgery for as long as your provider tells you to.   Coping with pain  If you have pain after surgery, pain medicine will help you feel better. Take it as directed, before pain becomes severe. Also, ask  your healthcare provider or pharmacist about other ways to control pain. This might be with heat, ice, or relaxation. And follow any other instructions your surgeon or nurse gives you.      Stay on schedule with your medicine.     Tips for taking pain medicine  To get the best relief possible, remember these points:   Pain medicines can upset your stomach. Taking them with a little food may help.  Most pain relievers taken by mouth need at least 20 to 30 minutes to start to work.  Don't wait till your pain becomes severe before you take your medicine. Try to time your medicine so that you can take it before starting an activity. This might be before you get dressed, go for a walk, or sit down for dinner.  Constipation is a common side effect of some pain medicines. Call your healthcare provider before taking any medicines such as laxatives or stool softeners to help ease constipation. Also ask if you should skip any foods. Drinking lots of fluids and eating foods such as fruits and vegetables that are high in fiber can also help. Remember, don't take laxatives unless your surgeon has prescribed them.  Drinking alcohol and taking pain medicine can cause dizziness and slow your breathing. It can even be deadly. Don't drink alcohol while taking pain medicine.  Pain medicine can make you react more slowly to things. Don't drive or run machinery while taking pain medicine.  Your healthcare provider may tell you to take acetaminophen to help ease your pain. Ask them how much you're supposed to take each day. Acetaminophen or other pain relievers may interact with your prescription medicines or other over-the-counter (OTC) medicines. Some prescription medicines have acetaminophen and other ingredients in them. Using both prescription and OTC acetaminophen for pain can cause you to accidentally overdose. Read the labels on your OTC medicines with care. This will help you to clearly know the list of ingredients, how much to  take, and any warnings. It may also help you not take too much acetaminophen. If you have questions or don't understand the information, ask your pharmacist or healthcare provider to explain it to you before you take the OTC medicine.   Managing nausea  Some people have an upset stomach (nausea) after surgery. This is often because of anesthesia, pain, or pain medicine, less movement of food in the stomach, or the stress of surgery. These tips will help you handle nausea and eat healthy foods as you get better. If you were on a special food plan before surgery, ask your healthcare provider if you should follow it while you get better. Check with your provider on how your eating should progress. It may depend on the surgery you had. These general tips may help:   Don't push yourself to eat. Your body will tell you when to eat and how much.  Start off with clear liquids and soup. They're easier to digest.  Next try semi-solid foods as you feel ready. These include mashed potatoes, applesauce, and gelatin.  Slowly move to solid foods. Don’t eat fatty, rich, or spicy foods at first.  Don't force yourself to have 3 large meals a day. Instead eat smaller amounts more often.  Take pain medicines with a small amount of solid food, such as crackers or toast. This helps prevent nausea.  When to call your healthcare provider  Call your healthcare provider right away if you have any of these:   You still have too much pain, or the pain gets worse, after taking the medicine. The medicine may not be strong enough. Or there may be a complication from the surgery.  You feel too sleepy, dizzy, or groggy. The medicine may be too strong.  Side effects such as nausea or vomiting. Your healthcare provider may advise taking other medicines to .  Skin changes such as rash, itching, or hives. This may mean you have an allergic reaction. Your provider may advise taking other medicines.  The incision looks different (for instance, part of it  opens up).  Bleeding or fluid leaking from the incision site, and weren't told to expect that.  Fever of 100.4°F (38°C) or higher, or as directed by your provider.  Call 911  Call 911 right away if you have:   Trouble breathing  Facial swelling    If you have obstructive sleep apnea   You were given anesthesia medicine during surgery to keep you comfortable and free of pain. After surgery, you may have more apnea spells because of this medicine and other medicines you were given. The spells may last longer than normal.    At home:  Keep using the continuous positive airway pressure (CPAP) device when you sleep. Unless your healthcare provider tells you not to, use it when you sleep, day or night. CPAP is a common device used to treat obstructive sleep apnea.  Talk with your provider before taking any pain medicine, muscle relaxants, or sedatives. Your provider will tell you about the possible dangers of taking these medicines.  Contact your provider if your sleeping changes a lot even when taking medicines as directed.  [employer] last reviewed this educational content on 10/1/2021  © 4978-6816 [employer]. All rights reserved. This information is not intended as a substitute for professional medical care. Always follow your healthcare professional's instructions.

## 2024-07-11 NOTE — INTERVAL H&P NOTE
Pre-op Diagnosis: Lumbosacral radiculopathy [M54.17]  DDD (degenerative disc disease), lumbosacral [M51.37]  Lumbosacral disc herniation [M51.27]  Left leg weakness [R29.898]  Left leg numbness [R20.0]    The above referenced H&P was reviewed by SCHUYLER Vaughan on 7/11/2024, the patient was examined and no significant changes have occurred in the patient's condition since the H&P was performed. He continues to endorse severe left sided radiating leg pain. Denies urinary or bowel incontinence. The patient denies chest pain, SOB, or abdominal pain. States he had nothing to eat since last night. Left dorsiflexion is 4/5. Otherwise LE strength is 5/5 bilaterally. I reviewed with the patient and/or legal representative the potential benefits, risks and side effects of this procedure; the likelihood of the patient achieving goals; and potential problems that might occur during recuperation.  I reviewed reasonable alternatives to the procedure, including risks, benefits and side effects related to the alternatives and risks related to not receiving this procedure.  We will proceed with procedure as planned. Dr. Busby to follow.

## 2024-07-11 NOTE — ANESTHESIA POSTPROCEDURE EVALUATION
Patient: Tre Mckinley    Procedure Summary       Date: 07/11/24 Room / Location: [facility] MAIN OR 09 / [facility] MAIN OR    Anesthesia Start: 0736 Anesthesia Stop: 0934    Procedure: Left Lumbar  5 - Sacral 1 minimally invasive microdiscectomy (Left: Spine Lumbar) Diagnosis:       Lumbosacral radiculopathy      DDD (degenerative disc disease), lumbosacral      Lumbosacral disc herniation      Left leg weakness      Left leg numbness      (Lumbosacral radiculopathy [M54.17]DDD (degenerative disc disease), lumbosacral [M51.37]Lumbosacral disc herniation [M51.27]Left leg weakness [R29.898]Left leg numbness [R20.0])    Surgeons: Candido Busby MD Anesthesiologist: Scout Colon MD    Anesthesia Type: general ASA Status: 1            Anesthesia Type: general    Vitals Value Taken Time   /84 07/11/24 0934   Temp 97.6 °F (36.4 °C) 07/11/24 0934   Pulse 97 07/11/24 0934   Resp 18 07/11/24 0934   SpO2 96 % 07/11/24 0934       [facility] AN Post Evaluation:   Patient Evaluated in PACU  Patient Participation: complete - patient participated  Level of Consciousness: awake and alert  Pain Score: 0  Pain Management: adequate  Airway Patency:patent  Dental exam unchanged from preop  Yes    Cardiovascular Status: acceptable  Respiratory Status: acceptable  Postoperative Hydration acceptable    Brigida Iraheta CRNA  7/11/2024 9:35 AM

## 2024-07-11 NOTE — PROGRESS NOTES
Neurosurgery Progress Note    Patient is POD#0 Left L5-S1 microdiscectomy with Dr. Busby. In PACU patient having severe lower back pain at incision site not relieved with multiple pushes of Fentayl and Dilaudid. Patient denies pain radiating down his legs. He notes the numbness in his left foot is improved compared to pre-op.     On exam patient is in discomfort and having muscle spasms. He is 5/5 strength BUE and 4+/5 strength b/l IP/KE (pain limited) otherwise full strength in distal BLE.     Patient given Valium and Robaxin in PACU continuing to have severe pain. Patient wishes to be admitted overnight for pain control. Will admit to Hospitalist and continue Robaxin first line and Valium second line for muscle spasms in addition to Tylenol, Oxycodone and Dilaudid as needed. Encourage ambulation and OOB once pain better controlled.    Discussed with Dr. Kehinde Arias PA-C  [facility]  7/11/2024 12:30PM

## 2024-07-11 NOTE — ANESTHESIA PROCEDURE NOTES
Airway  Date/Time: 7/11/2024 7:42 AM  Urgency: Elective    Airway not difficult    General Information and Staff    Patient location during procedure: OR  Anesthesiologist: Scout Colon MD  Resident/CRNA: Brigida Iraheta CRNA  Performed: CRNA   Performed by: Brigida Iraheta CRNA  Authorized by: Scout Colon MD      Indications and Patient Condition  Indications for airway management: anesthesia  Spontaneous ventilation: present  Sedation level: deep  Preoxygenated: yes  Patient position: sniffing  MILS maintained throughout  Mask difficulty assessment: 1 - vent by mask  Planned trial extubation    Final Airway Details  Final airway type: endotracheal airway      Successful airway: ETT  Cuffed: yes   Successful intubation technique: Video laryngoscopy  Facilitating devices/methods: intubating stylet  Endotracheal tube insertion site: oral  Blade: Juan (Kennedy blade)  Blade size: #4  ETT size (mm): 7.5    Cormack-Lehane Classification: grade I - full view of glottis  Placement verified by: capnometry   Inital cuff pressure (cm H2O): 5  Cuff volume (mL): 8  Measured from: lips  ETT to lips (cm): 23  Number of attempts at approach: 1

## 2024-07-12 VITALS
OXYGEN SATURATION: 94 % | SYSTOLIC BLOOD PRESSURE: 138 MMHG | TEMPERATURE: 98 F | HEART RATE: 82 BPM | HEIGHT: 67 IN | DIASTOLIC BLOOD PRESSURE: 82 MMHG | RESPIRATION RATE: 16 BRPM | BODY MASS INDEX: 29.82 KG/M2 | WEIGHT: 190 LBS

## 2024-07-12 LAB
HCT VFR BLD AUTO: 42.5 %
HGB BLD-MCNC: 15 G/DL

## 2024-07-12 PROCEDURE — 99214 OFFICE O/P EST MOD 30 MIN: CPT | Performed by: HOSPITALIST

## 2024-07-12 NOTE — DISCHARGE SUMMARY
[facility]  part of [facility]     DISCHARGE SUMMARY     Tre Mckinley Patient Status:  Outpatient in a Bed    1995 MRN [id number]   Location [facility] 4W/SW/SE Attending Daryn Christianson MD   Hosp Day # 0 PCP Hernan Perez MD     DATE OF ADMISSION: 2024  DATE OF DISCHARGE:  24  DISPOSITION: home  CONDITION ON DISCHARGE: good    DISCHARGE DIAGNOSES:  Preoperative diagnosis:  1.  Left L5-S1 disc herniation with intractable radiculopathy        Postoperative diagnosis:  Same        Procedure performed:  1.  Left L5 subtotal inferior hemilaminectomy  2.  Left S1 subtotal superior hemilaminectomy  3.  Left L5-S1 medial facetectomy  4.  Left L5-S1 minimally invasive microdiscectomy  5.  Use of operating microscope with surgical microdissection techniques   6.  Use of intraoperative fluoroscopy with live interpretation of intraoperative imaging  7.  Epidural steroid application, left L5-S1    HISTORY OF PRESENT ILLNESS (COPIED FROM ADMISSION H&P)   Added using voice recognition   by Candido Busby MD   at 2024 9:44 AM      Neurosurgery operative report           Preoperative diagnosis:  1.  Left L5-S1 disc herniation with intractable radiculopathy        Postoperative diagnosis:  Same        Procedure performed:  1.  Left L5 subtotal inferior hemilaminectomy  2.  Left S1 subtotal superior hemilaminectomy  3.  Left L5-S1 medial facetectomy  4.  Left L5-S1 minimally invasive microdiscectomy  5.  Use of operating microscope with surgical microdissection techniques   6.  Use of intraoperative fluoroscopy with live interpretation of intraoperative imaging  7.  Epidural steroid application, left L5-S1           Surgeon: Candido Busby MD           Assistant: Ale Pratt PA-C           Anesthesia: General endotracheal           Estimated blood loss: 10 cc           Indications for procedure:     Please also see the relevant progress notes for  further information.  Briefly, this patient was evaluated in the outpatient office.  He presented with a left lumbosacral radiculopathy.  Imaging workup was reviewed.  This demonstrated a large left L5-S1 disc herniation.  The patient was counseled regarding available surgical and nonsurgical treatment options.  Following a discussion of risk and benefits, he chose to proceed with this operation.      HOSPITAL COURSE:  Patient was admitted after elective surgery as described above.  Did well postoperatively.  Pain well-controlled.  No new neurologic symptoms.  Awaiting final clearance from neurosurgery for discharge.    Patient understands return to the emergency room for increased pain, fever, discharge, shortness of breath, chest pain, new neurologic symptoms, other concerning symptoms.    PHYSICAL EXAM:  Temp:  [97.6 °F (36.4 °C)-98.4 °F (36.9 °C)] 98.3 °F (36.8 °C)  Pulse:  [70-98] 87  Resp:  [10-18] 16  BP: (112-146)/(57-89) 117/60  SpO2:  [93 %-98 %] 94 %  Gen: A+Ox3.  No distress.   HEENT: NCAT, neck supple, no carotid bruit.  CV: RRR, S1S2, and intact distal pulses. No gallop, rub, murmur.  Pulm: Effort and breath sounds normal. No distress, wheezes, rales, rhonchi.  Abd: Soft, NTND, BS normal, no mass, no HSM, no rebound/guarding.   Neuro: Normal reflexes, CN. Sensory/motor exams grossly normal deficit. Coordination  and gait normal.   MS: No joint effusions.  No peripheral edema.  Incision clean dry and intact  Skin: Skin is warm and dry. No rashes, erythema, diaphoresis.   Psych: Normal mood and affect. Behavior and judgment normal.     DISCHARGE MEDICATIONS     Discharge Medications        START taking these medications        Instructions Prescription details   acetaminophen 500 MG Tabs  Commonly known as: Tylenol Extra Strength      Take 1 tablet (500 mg total) by mouth every 6 (six) hours as needed for Pain.   Quantity: 120 tablet  Refills: 0     methocarbamol 500 MG Tabs  Commonly known as: Robaxin       Take 1 tablet (500 mg total) by mouth 3 (three) times daily.   Quantity: 60 tablet  Refills: 0     Naloxone HCl 4 MG/0.1ML Liqd      4 mg by Nasal route as needed. If patient remains unresponsive, repeat dose in other nostril 2-5 minutes after first dose.   Quantity: 1 kit  Refills: 0     oxyCODONE 5 MG Tabs      Take 1 tablet (5 mg total) by mouth every 4 (four) hours as needed for Pain.   Quantity: 30 tablet  Refills: 0               Where to Get Your Medications        These medications were sent to [id number] - [place], [place] - [address] AT [facility], [phone], [phone]  [address] [facility] [address]      Phone: [phone]   acetaminophen 500 MG Tabs  methocarbamol 500 MG Tabs  Naloxone HCl 4 MG/0.1ML Liqd  oxyCODONE 5 MG Tabs         CONSULTANTS  Consultants         Provider   Role Specialty     Faina May MD      Consulting Physician HOSPITALIST            FOLLOW UP:  Candido Busby MD  [address] [place] [id number] [phone]    Follow up in 2 week(s)      The above plan and follow-up instructions were reviewed with the patient and they verbalized understanding and agreement.  They understand to return to the emergency room for any concerning signs or symptoms.  Greater than 30 minutes spent on discharge.  -----------------------    Hospital Discharge Diagnoses:  disc herb    Lace+ Score: 40  59-90 High Risk  29-58 Medium Risk  0-28   Low Risk.    TCM Follow-Up Recommendation:  LACE 29-58: Moderate Risk of readmission after discharge from the hospital.

## 2024-07-12 NOTE — PHYSICAL THERAPY NOTE
PHYSICAL THERAPY EVALUATION - INPATIENT     Room Number: 422/422-A  Evaluation Date: 7/12/2024  Type of Evaluation: Initial   Physician Order: PT Eval and Treat    Presenting Problem: severe L5-S1 radiculopathy s/p microdiscectomy L5-S1     Reason for Therapy: Mobility Dysfunction and Discharge Planning    PHYSICAL THERAPY ASSESSMENT   Patient is a 29 year old male admitted 7/11/2024 for large L5-S1 disc herniation with LLE pain and weakness s/p microdiscectomy.  Prior to admission, patient's baseline is completely independent and working and active physically demanding job prior to the herniation, recently very limited mobility due to pain and weakness.  Patient is currently functioning below baseline with bed mobility, transfers, gait, stair negotiation, maintaining seated position, standing prolonged periods, and performing household tasks.    PLAN  Patient has been evaluated and presents with no skilled Physical Therapy needs at this time.  Patient will be discharged from Physical Therapy services. Please re-order if a new functional limitation presents during this admission. Patient would benefit from OP PT to progress to plf with good body mechanics and lifting strategies and restore normal spine ROM    PHYSICAL THERAPY MEDICAL/SOCIAL HISTORY   History related to current admission: He continues to endorse severe left sided radiating leg pain. Denies urinary or bowel incontinence. The patient denies chest pain, SOB, or abdominal pain. States he had nothing to eat since last night. Left dorsiflexion is 4/5. Otherwise LE strength is 5/5 bilaterally.      Problem List  Active Problems:    S/P lumbar microdiscectomy      HOME SITUATION  Home Situation  Type of Home: House  Home Layout: One level  Stairs to Enter : 1  Railing: No  Lives With: Significant other;Family (Stephanie and 2 and7 yr old children)  Drives: Yes  Patient Owned Equipment: None  Patient Regularly Uses: None     Prior Level of [place]: Patient  lives with Stephanie and two young children in a home with 1 CHELO. He is normally physically very active and exercises regularly as well as does a physically demanding job. 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    SUBJECTIVE  \"I am afraid of that stabbing pain\"    PHYSICAL THERAPY EXAMINATION   OBJECTIVE  Precautions: Spine (log roll)  Fall Risk: Standard fall risk    WEIGHT BEARING RESTRICTION  Weight Bearing Restriction: None                PAIN ASSESSMENT  Ratin  Location: low back and occasionally down into the leg  Management Techniques: Activity promotion;Body mechanics;Breathing techniques;Relaxation;Repositioning;Other (Comment) (medicated, ice packs applied)  COGNITION  Overall Cognitive Status:  wnl    RANGE OF MOTION AND STRENGTH ASSESSMENT  Upper extremity ROM and strength are within functional limits   Lower extremity ROM is within functional limits but limited lumbar extension and sacral flexion, limited BLE hip rotation  Lower extremity strength is within functional limits except left ankle pf 4/5    BALANCE  Static Sitting: Normal  Dynamic Sitting: Normal  Static Standing: Good  Dynamic Standing: Fair +      NEUROLOGICAL FINDINGS           Sensation: intermittent complaints of pain down L hip/thigh but able to centralize pain wtih positioning          ACTIVITY TOLERANCE  Pulse: 82  Heart Rate Source: Monitor     BP: 138/82  BP Location: Right arm  BP Method: Automatic  Patient Position: Standing    O2 WALK  Oxygen Therapy  SPO2% on Room Air at Rest: 99  SPO2% Ambulation on Room Air: 97    AM-PAC '6-Clicks' INPATIENT SHORT FORM - BASIC MOBILITY  How much difficulty does the patient currently have...  Patient Difficulty: Turning over in bed (including adjusting bedclothes, sheets and blankets)?: A Little   Patient Difficulty: Sitting down on and standing up from a chair with arms (e.g., wheelchair, bedside commode, etc.): A Little   Patient Difficulty: Moving from lying on back to sitting on the  side of the bed?: A Little   How much help from another person does the patient currently need...   Help from Another: Moving to and from a bed to a chair (including a wheelchair)?: A Little   Help from Another: Need to walk in hospital room?: A Little   Help from Another: Climbing 3-5 steps with a railing?: A Little     AM-PAC Score:  Raw Score: 18   Approx Degree of Impairment: 46.58%   Standardized Score (AM-PAC Scale): 43.63   CMS Modifier (G-Code): CK    FUNCTIONAL ABILITY STATUS  Functional Mobility/Gait Assessment  Gait Assistance: Supervision  Distance (ft): 150'x2  Assistive Device: None  Pattern: L Decreased stance time;Compensated trendelenburg;L Circumduction (LLE abducted)  Stairs: Stoop/curb;Car transfer  Stoop/Curb: Education about stepping up wtih RLE, down w/LLE until he feels pain is better.  Car transfer: Education to sit first and then bring legs in or out, use a small roll for his low back  Rolling: supervision  Supine to Sit: supervision  Sit to Supine: supervision cues and training in log roll technique  Sit to Stand: supervision Cues to keep chest lifted and watch low back posture    Exercise/Education Provided:  Bed mobility  Body mechanics  Energy conservation  Functional activity tolerated  Gait training  Neuromuscular re-educate  Posture  Transfer training  Stair navigation, car transfers    Skilled Therapy Provided: SADE Guevara approves participation, has provided pt with pain meds. ALLIE Brown present throughout session and educated along with patient. Discussion and education on sitting and standing posture with use of supports, against wall or using counter top at hips to provide support. Log roll and spinal protection strategies for bed mobility and pt able to return demo at end of session. Discussion and hands on neuromusc re-education for glut medius activation and correct heel to toe gait mechanics on LLE with pt able to perform for short distances with no increase in pain. Education to  continue to progress walking and working on correct mechanics. He is educated regarding spinal precautions and able to verbalize understanding but will need ongoing training. I do recommend he attend OP PT to work on correct body mechanics and restoration of normal AROM in lumbosacral region as he is currently rigid and limited mobility, to allow him to return to his job. Discussion about pacing, energy conservation, frequent position changes, neutral spine and gentle lumbar extension. He is ready for dc from PT perspective. All questions answered and RN aware, ice applied and pt aware to use.    The patient's Approx Degree of Impairment: 46.58% has been calculated based on documentation in the [facility] '6 clicks' Inpatient Basic Mobility Short Form.  Research supports that patients with this level of impairment may benefit from home w/HHPT. However, anticipate pt will most benefit from OP PT when cleared by surgeon.  Final disposition will be made by interdisciplinary medical team.    Patient End of Session: Up in chair;Needs met;Call light within reach;RN aware of session/findings;All patient questions and concerns addressed;Ice applied;Family present    Patient was able to achieve the following ...   Patient able to transfer  Safely and independently  With occasional cues for spinal precautions    Patient able to ambulate on level surfaces  Safely and independently  With cues and training in LLE heel to toe and normal weight shifting which is still limited by pain in back     Patient Evaluation Complexity Level:  History Low - no personal factors and/or co-morbidities   Examination of body systems High - addressing a total of 4 or more elements   Clinical Presentation Low- Stable   Clinical Decision Making  Low Complexity     Gait Trainin minutes  Therapeutic Activity:  18 minutes  Therapeutic Exercise: 12 minutes

## 2024-07-12 NOTE — PROGRESS NOTES
called for nurse to be assigned to pt. Room ready and cleaned.  informed writer of note that she will work on assigning a nurse to pt.

## 2024-07-12 NOTE — PROGRESS NOTES
[facility]  Neurosurgery Progress Note    Tre Mckinley Patient Status:  Outpatient in a Bed    1995 MRN [id number]   Location [facility] 4W/SW/SE Attending Daryn Christianson MD   Hosp Day # 0 PCP Hernan Perez MD     Chief Complaint:  POD #1 left L5-S1 microdiscectomy     Subjective:  Pt examined, reports improvement of low back pain. He states the radiating left leg pain he experienced preoperatively has resolved. Denies numbness or tingling of lower extremities. States he is ready to go home.     Objective/Physical Exam:    Vital Signs:  Blood pressure 117/60, pulse 87, temperature 98.3 °F (36.8 °C), temperature source Oral, resp. rate 16, height 67\", weight 190 lb (86.2 kg), SpO2 94%.    Respiratory:  Respirations nonlabored    General: NAD, Speech Fluent, Mood Appropriate    Neurologic: This patient is alert and orientated x 3.  Able to follow commands.  Face is symmetric. Lower extremity strength is 5/5 bilaterally. LE sensation intact.     Skin: Bandage over midline lumbar incision is clean, dry, intact without drainage     Labs:  Lab Results   Component Value Date    HGB 15.0 2024    HCT 42.5 2024       Imaging:  XR FLUOROSCOPY C-ARM TIME LESS THAN 1 HOUR (CPT=76000)    Result Date: 2024   Fluoroscopy support was provided.  Images demonstrate instrumentation of the lumbar spine. Please see separate report for additional details.     Dictated by (CST): Jaspal Singleton MD on 2024 at 1:48 PM     Finalized by (CST): Jaspal Singleton MD on 2024 at 1:51 PM            Assessment:  28 y/o male POD #1 left L5-S1 microdiscectomy     Plan:  Medical management per hospitalist team. Patient is stable for discharge to home from a neurosurgical standpoint.   Discussed lifting restrictions of no greater than 10 lbs for 3 weeks. Encouraged frequent ambulation.  Outpatient pain control sent to pharmacy   Discharge instructions documented   Outpatient neurosurgery follow up in  3 weeks     Patient seen and assessed. Plan of care discussed with pt who agrees to the plan. Will discuss with Dr. Busby.       Aimee Pratt PA-C   [facility]  7/12/2024, 9:33 AM        A total of 15 minutes of visit time (exclusible of billable procedures) was administered.  > 50 % of time spent counseling/coordinating care     Is this a shared or split note between Advanced Practice Provider and Physician? No

## 2024-07-29 ENCOUNTER — TELEPHONE (OUTPATIENT)
Dept: SURGERY | Facility: CLINIC | Age: 29
End: 2024-07-29

## 2024-07-29 NOTE — TELEPHONE ENCOUNTER
Fax: [phone]    Mairana Turner is requesting to have clinical staff to send over op report from DOS 07/11/2024.

## 2024-08-02 ENCOUNTER — OFFICE VISIT (OUTPATIENT)
Dept: SURGERY | Facility: CLINIC | Age: 29
End: 2024-08-02
Payer: OTHER MISCELLANEOUS

## 2024-08-02 VITALS
SYSTOLIC BLOOD PRESSURE: 133 MMHG | HEIGHT: 67 IN | DIASTOLIC BLOOD PRESSURE: 83 MMHG | BODY MASS INDEX: 31.23 KG/M2 | WEIGHT: 199 LBS | HEART RATE: 93 BPM

## 2024-08-02 DIAGNOSIS — Z98.890 POST-OPERATIVE STATE: ICD-10-CM

## 2024-08-02 DIAGNOSIS — M54.50 ACUTE BILATERAL LOW BACK PAIN WITHOUT SCIATICA: ICD-10-CM

## 2024-08-02 DIAGNOSIS — Z98.890 S/P LUMBAR MICRODISCECTOMY: Primary | ICD-10-CM

## 2024-08-02 PROCEDURE — 99024 POSTOP FOLLOW-UP VISIT: CPT

## 2024-08-02 RX ORDER — METHOCARBAMOL 750 MG/1
750 TABLET, FILM COATED ORAL 3 TIMES DAILY
Qty: 60 TABLET | Refills: 0 | Status: SHIPPED | OUTPATIENT
Start: 2024-08-02

## 2024-08-02 NOTE — PROGRESS NOTES
Patient: Tre Mckinley  Medical Record Number: [id number]  YOB: 1995  PCP: Hernan Perez MD    Reason for visit: Lumbar follow up    HISTORY OF CHIEF COMPLAINT:    Tre Mckinley is a very pleasant 29 year old male who presents for lumbar follow up, post op visit   S/p 07/11/2024: Dr. Busby: Left L5-S1 minimally invasive microdiscectomy   Patient reports resolution of his preoperative left leg pain. He does complain of low back pain with occasional radiation into his left hip. The pain is bilateral. Worse with standing, sitting, or laying for extended periods; walking helps. Ice provides some relief. Temporary improvement with 500mg Robaxin. He denies radiating leg pain, numbness, tingling or new leg weakness. Denies bowel or bladder incontinence.   At this time patient does not feel ready to go back to work. He states he does not have a light duty option and work would involve heavy lifting.   Overall, his pain has much improved since surgery and he feels he is regaining his strength. He is pleased with this progress.   Past Medical History:    Bursitis    Generalized anxiety disorder    Lumbar herniated disc    Non-alcoholic fatty liver disease      Past Surgical History:   Procedure Laterality Date    Back surgery  07/2024    1.  Left L5 subtotal inferior hemilaminectomy 2.  Left S1 subtotal superior hemilaminectomy 3.  Left L5-S1 medial facetectomy 4.  Left L5-S1 minimally invasive microdiscectomy 5.  Use of operating microscope with surgical microdissection techniques  6.  Use of intraoperative fluoroscopy with live interpretation of intraoperative imaging 7.  Epidural steroid application, left L5-S1    Lasik        Family History   Problem Relation Age of Onset    Psoriasis Father     Other (smoking) Father     Diabetes Mother         Biological mother    Anxiety Mother     Asthma Mother     Arthritis Mother         Back    No Known Problems Maternal Grandmother     No  Known Problems Maternal Grandfather     No Known Problems Paternal Grandmother     No Known Problems Paternal Grandfather     Anxiety Sister     Psoriasis Sister     Other (Brain aneurysm) Paternal Aunt     Colon Cancer Neg     Prostate Cancer Neg       Social History     Socioeconomic History    Marital status: Single   Tobacco Use    Smoking status: Never    Smokeless tobacco: Never   Vaping Use    Vaping status: Never Used   Substance and Sexual Activity    Alcohol use: Not Currently    Drug use: Yes     Types: Cannabis     Comment: uses for sleep occasionally    Sexual activity: Yes     Partners: Female      No Known Allergies   Current Medications:  Current Outpatient Medications   Medication Sig Dispense Refill    oxyCODONE 5 MG Oral Tab Take 1 tablet (5 mg total) by mouth every 4 (four) hours as needed for Pain. 30 tablet 0    methocarbamol 500 MG Oral Tab Take 1 tablet (500 mg total) by mouth 3 (three) times daily. 60 tablet 0    Naloxone HCl 4 MG/0.1ML Nasal Liquid 4 mg by Nasal route as needed. If patient remains unresponsive, repeat dose in other nostril 2-5 minutes after first dose. 1 kit 0        REVIEW OF SYSTEMS   Comprehensive review of systems done. Negative except what is outlined in the above HPI.     PHYSICAL EXAMINATION    vitals were not taken for this visit.   GENERAL: Very pleasant patient is in no apparent distress. Sitting comfortably in the examination chair.   HEENT: Normocephalic, atraumatic.  RESPIRATORY RATE: Easy and Even  SKIN: Warm and dry  NEURO: Awake, alert and orientated. Speech fluent, comprehension intact, answering questions appropriately.     SPINE:  Gait/Coordination: Gait deferred.   Sensation: Sensory deficits noted on bilateral lower extremities to light touch: None   Lumbar Integument: Lumbar incision appears to be well healed without erythema, edema, or drainage  Palpation: Non tender to palpation of midline lumbar spine. There is tenderness to palpation of the  paraspinal musculature and flank bilaterally.     Moving bilateral upper extremities spontaneously to full resistance     Lower Extremity Strength:     Iliopsoas  Hamstrings   Quads    D-flexion P-flexion   Right       5         5       5         5 5   Left       5         5       5         5 5       DATA:   None    IMAGING:   No recent imaging to review     MEDICAL DECISION MAKING:     ASSESSMENT and PLAN:  1. Post-operative state    2. S/P lumbar microdiscectomy      Patient reports resolution of his preoperative symptoms. I suspect his low back pain to be musculoskeletal in nature as he is tender to palpation in the described areas on exam.   PLAN:   1. Medication: Robaxin increased to 750mg TID.   2. Imaging:    - Reviewed today: None    - Ordered today: None       3. Activity: At this time, patient may gradually return to normal activity   4. Referral: Order for PT work conditioning provided. Anticipate PT will also be beneficial for his musculoskeletal low back pain.    5. Work: Out of work letter provided as patient does not have light duty option. Will reassess at next visit.   6. Follow up for 6 week post op visit with Dr. Busby as scheduled or call or follow up sooner or go to the ED for any new, worsening or concerning signs or symptoms         Total visit time: 30 minutes  More than 50% spent coordinating care, providing patient education, reviewing imaging and counseling.    Aimee Pratt M.S., PA-C  [address], [address] [address] [phone]  8/2/2024 9:29 AM    Dragon speech recognition software was used to prepare this note. If a word or phrase is confusing, it is likely due to a failure of recognition. Please contact me with any questions or clarifications.

## 2024-08-02 NOTE — PROGRESS NOTES
Last procedure: 7/11/24  Last office visit: 7/10/24  Pain Level: 5/10. Patient states that they are having pain located on their lower back.   Numbness / Tingling: Patient reports occasional numbness and tingling on left upper thigh.   Physical Therapy / Injections: Patient denies completing any recent Physical Therapy / Injections

## 2024-08-02 NOTE — PATIENT INSTRUCTIONS
Refill policies:    Allow 2-3 business days for refills; controlled substances may take longer.  Contact your pharmacy at least 5 days prior to running out of medication and have them send an electronic request or submit request through the “request refill” option in your [employer] account.  Refills are not addressed on weekends; covering physicians do not authorize routine medications on weekends.  No narcotics or controlled substances are refilled after noon on Fridays or by on call physicians.  By law, narcotics must be electronically prescribed.  A 30 day supply with no refills is the maximum allowed.  If your prescription is due for a refill, you may be due for a follow up appointment.  To best provide you care, patients receiving routine medications need to be seen at least once a year.  Patients receiving narcotic/controlled substance medications need to be seen at least once every 3 months.  In the event that your preferred pharmacy does not have the requested medication in stock (e.g. Backordered), it is your responsibility to find another pharmacy that has the requested medication available.  We will gladly send a new prescription to that pharmacy at your request.    Scheduling Tests:    If your physician has ordered radiology tests such as MRI or CT scans, please contact Central Scheduling at [phone] right away to schedule the test.  Once scheduled, the [facility] Centralized Referral Team will work with your insurance carrier to obtain pre-certification or prior authorization.  Depending on your insurance carrier, approval may take 3-10 days.  It is highly recommended patients assure they have received an authorization before having a test performed.  If test is done without insurance authorization, patient may be responsible for the entire amount billed.      Precertification and Prior Authorizations:  If your physician has recommended that you have a procedure or additional testing performed the [facility]  Centralized Referral Team will contact your insurance carrier to obtain pre-certification or prior authorization.    You are strongly encouraged to contact your insurance carrier to verify that your procedure/test has been approved and is a COVERED benefit.  Although the [facility] Centralized Referral Team does its due diligence, the insurance carrier gives the disclaimer that \"Although the procedure is authorized, this does not guarantee payment.\"    Ultimately the patient is responsible for payment.   Thank you for your understanding in this matter.  Paperwork Completion:  If you require FMLA or disability paperwork for your recovery, please make sure to either drop it off or have it faxed to our office at [phone]. Be sure the form has your name and date of birth on it.  The form will be faxed to our Forms Department and they will complete it for you.  There is a 25$ fee for all forms that need to be filled out.  Please be aware there is a 10-14 day turnaround time.  You will need to sign a release of information (SILVER) form if your paperwork does not come with one.  You may call the Forms Department with any questions at [phone].  Their fax number is [phone].

## 2024-08-06 ENCOUNTER — TELEPHONE (OUTPATIENT)
Dept: SURGERY | Facility: CLINIC | Age: 29
End: 2024-08-06

## 2024-08-06 NOTE — TELEPHONE ENCOUNTER
Dami gaona , needs a letter to return to work on light duty starting Monday August 12th, if you can please add the restrictions as before the surgery. Patient's employer is going to accommodate him in accordance to restrictions.

## 2024-08-06 NOTE — TELEPHONE ENCOUNTER
Msg below noted.    Call placed to pt to inform, pt appreciative of call.    Nothing further needed at this time, closing encounter.

## 2024-08-06 NOTE — TELEPHONE ENCOUNTER
Patient is s/p left L5-S1 microdiscectomy by Dr Busby on 7-11-24.  He was seen on 8-2-24 by SCHUYLER Pratt for 3 week post op.   Per OV note:  \"At this time patient does not feel ready to go back to work. He states he does not have a light duty option and work would involve heavy lifting.   Overall, his pain has much improved since surgery and he feels he is regaining his strength. He is pleased with this progress. \"    1. Post-operative state    2. S/P lumbar microdiscectomy       Patient reports resolution of his preoperative symptoms. I suspect his low back pain to be musculoskeletal in nature as he is tender to palpation in the described areas on exam.   PLAN:   1. Medication: Robaxin increased to 750mg TID.   2. Imaging:                 - Reviewed today: None                 - Ordered today: None                                 3. Activity: At this time, patient may gradually return to normal activity   4. Referral: Order for PT work conditioning provided. Anticipate PT will also be beneficial for his musculoskeletal low back pain.    5. Work: Out of work letter provided as patient does not have light duty option. Will reassess at next visit.   6. Follow up for 6 week post op visit with Dr. Busby as scheduled or call or follow up sooner or go to the ED for any new, worsening or concerning signs or symptoms       Per patient, he spoke to his work who will be able to make accommodations for him for light duty.  Letter initiated and routed to SCHUYLER Pratt.

## 2024-08-07 ENCOUNTER — TELEPHONE (OUTPATIENT)
Dept: PHYSICAL THERAPY | Age: 29
End: 2024-08-07

## 2024-08-09 ENCOUNTER — APPOINTMENT (OUTPATIENT)
Dept: PHYSICAL THERAPY | Age: 29
End: 2024-08-09
Payer: OTHER MISCELLANEOUS

## 2024-08-14 ENCOUNTER — APPOINTMENT (OUTPATIENT)
Dept: PHYSICAL THERAPY | Age: 29
End: 2024-08-14
Payer: OTHER MISCELLANEOUS

## 2024-08-20 ENCOUNTER — APPOINTMENT (OUTPATIENT)
Dept: PHYSICAL THERAPY | Age: 29
End: 2024-08-20
Payer: OTHER MISCELLANEOUS

## 2024-08-22 ENCOUNTER — APPOINTMENT (OUTPATIENT)
Dept: PHYSICAL THERAPY | Age: 29
End: 2024-08-22
Payer: OTHER MISCELLANEOUS

## 2024-08-23 ENCOUNTER — OFFICE VISIT (OUTPATIENT)
Dept: SURGERY | Facility: CLINIC | Age: 29
End: 2024-08-23
Payer: COMMERCIAL

## 2024-08-23 VITALS
DIASTOLIC BLOOD PRESSURE: 94 MMHG | HEART RATE: 93 BPM | HEIGHT: 67 IN | BODY MASS INDEX: 31.23 KG/M2 | WEIGHT: 199 LBS | SYSTOLIC BLOOD PRESSURE: 150 MMHG

## 2024-08-23 DIAGNOSIS — M51.27 HERNIATED NUCLEUS PULPOSUS, L5-S1, LEFT: Primary | ICD-10-CM

## 2024-08-23 NOTE — PROGRESS NOTES
Neurosurgery Clinic Visit  2024    Tre Mckinley PCP:  Hernan Perez MD    1995 MRN [id number]     HISTORY OF PRESENT ILLNESS:  Tre Mckinley is a(n) 29 year old male presents today in postoperative follow-up.  On , I performed a left L5-S1 minimally invasive microdiscectomy.    He is making an excellent recovery.  His left leg pain has improved dramatically.  He has some occasional mild back pain.  He has returned to work as a Pressman.  He is currently on modified duty, with a 10 pound lifting restriction.  His coworkers are very helpful.      PHYSICAL EXAMINATION:  Vital Signs:  BP (!) 150/94 (BP Location: Left arm, Patient Position: Sitting, Cuff Size: adult)   Pulse 93   Ht 67\"   Wt 199 lb (90.3 kg)   BMI 31.17 kg/m²   On examination, the incision is well-healed.  Small seroma.  Able to stand on his toes and heels without difficulty.      REVIEW OF STUDIES:    No new imaging      ASSESSMENT and PLAN:  1.  Lumbar disc herniation, status post minimally invasive microdiscectomy.    Patient is making an excellent recovery.    Activity recommendations reviewed.     he will see [place] physical therapy for work conditioning.    Provided return to work letter, 20 pound lifting restriction.    Follow-up in 4 weeks for reevaluation.  At that point, I am optimistic he will be able to return without restrictions.        Time spent on counseling/coordination of care:  15 Minutes    Total time spent with patient:  15 Minutes        2024  3:34 PM     This report was dictated using voice recognition technology.  Errors in syntax or recognition may occur, and should not be construed to change the meaning of a sentence.

## 2024-08-26 ENCOUNTER — APPOINTMENT (OUTPATIENT)
Dept: PHYSICAL THERAPY | Age: 29
End: 2024-08-26
Payer: OTHER MISCELLANEOUS

## 2024-08-28 ENCOUNTER — TELEPHONE (OUTPATIENT)
Dept: PHYSICAL THERAPY | Facility: HOSPITAL | Age: 29
End: 2024-08-28

## 2024-08-28 ENCOUNTER — APPOINTMENT (OUTPATIENT)
Dept: PHYSICAL THERAPY | Age: 29
End: 2024-08-28
Payer: OTHER MISCELLANEOUS

## 2024-09-03 ENCOUNTER — APPOINTMENT (OUTPATIENT)
Dept: PHYSICAL THERAPY | Age: 29
End: 2024-09-03
Payer: OTHER MISCELLANEOUS

## 2024-09-04 ENCOUNTER — APPOINTMENT (OUTPATIENT)
Dept: PHYSICAL THERAPY | Age: 29
End: 2024-09-04
Payer: OTHER MISCELLANEOUS

## 2024-09-05 ENCOUNTER — APPOINTMENT (OUTPATIENT)
Dept: PHYSICAL THERAPY | Age: 29
End: 2024-09-05
Payer: OTHER MISCELLANEOUS

## 2024-09-06 ENCOUNTER — TELEPHONE (OUTPATIENT)
Dept: SURGERY | Facility: CLINIC | Age: 29
End: 2024-09-06

## 2024-09-06 NOTE — TELEPHONE ENCOUNTER
Patient calling stating the problems he was having prior to surgery are back. Specifically the leg. Patient is looking for answers for the next step

## 2024-09-09 ENCOUNTER — APPOINTMENT (OUTPATIENT)
Dept: PHYSICAL THERAPY | Age: 29
End: 2024-09-09
Payer: OTHER MISCELLANEOUS

## 2024-09-09 NOTE — TELEPHONE ENCOUNTER
I spoke with the patient directly.  He had acute episode of left leg pain a few weeks ago that lasted for 1 day and has not returned since.  His primary complaint at this time is left posterior leg and foot numbness that occurs while he is sitting on the toilet having a BM. Patient denies the symptoms occurring at any other time.  He states the numbness eventually goes away.  He denies any buttock/groin/perineal numbness, he denies any bowel or bladder incontinence.  He denies any new leg weakness.  I advised the patient that this may have more to do with the position that he is sitting in.  He may try putting a stool or other item under his feet and avoid sitting on the toilet for long periods.    Patient also explains that he has had difficulty with PT appointments.  He states that the physical therapy clinic has canceled several appointments.  He is eager to get back to PT. I explained that I can provide a new order for external PT.  He would like to consider this when he comes for follow-up appointment next week.    I reviewed red flag symptoms that should warrant ER evaluation including onset of bowel or bladder incontinence, saddle anesthesia, new leg weakness.  Patient verbalizes understanding.

## 2024-09-09 NOTE — TELEPHONE ENCOUNTER
Patient was having testicle pain and shooting down left leg. It only happened that day and now pain is gone. 7-8/10 on pain scale when it happened. Patient had a little bit of numbness in calf and numbness in foot at that time. Patient continues to get numbness in foot when he sits down. No bowel or bladder incontinence.      Patient has been trying to get therapy for work conditioning but they keep canceling his appointment.     Please advise on what patient should do.     Patient last seen on 8/23/24 by Dr. Busby, Assessment and plan below.     ASSESSMENT and PLAN:  1.  Lumbar disc herniation, status post minimally invasive microdiscectomy.     Patient is making an excellent recovery.     Activity recommendations reviewed.      he will see [facility] for work conditioning.     Provided return to work letter, 20 pound lifting restriction.     Follow-up in 4 weeks for reevaluation.  At that point, I am optimistic he will be able to return without restrictions.

## 2024-09-11 ENCOUNTER — APPOINTMENT (OUTPATIENT)
Dept: PHYSICAL THERAPY | Age: 29
End: 2024-09-11
Payer: OTHER MISCELLANEOUS

## 2024-09-16 ENCOUNTER — APPOINTMENT (OUTPATIENT)
Dept: PHYSICAL THERAPY | Age: 29
End: 2024-09-16
Payer: OTHER MISCELLANEOUS

## 2024-09-18 ENCOUNTER — APPOINTMENT (OUTPATIENT)
Dept: PHYSICAL THERAPY | Age: 29
End: 2024-09-18
Payer: OTHER MISCELLANEOUS

## 2024-09-18 ENCOUNTER — TELEPHONE (OUTPATIENT)
Dept: SURGERY | Facility: CLINIC | Age: 29
End: 2024-09-18

## 2024-09-18 ENCOUNTER — OFFICE VISIT (OUTPATIENT)
Dept: SURGERY | Facility: CLINIC | Age: 29
End: 2024-09-18
Payer: COMMERCIAL

## 2024-09-18 VITALS
OXYGEN SATURATION: 93 % | WEIGHT: 198 LBS | DIASTOLIC BLOOD PRESSURE: 82 MMHG | HEART RATE: 90 BPM | BODY MASS INDEX: 31.08 KG/M2 | SYSTOLIC BLOOD PRESSURE: 136 MMHG | HEIGHT: 67 IN

## 2024-09-18 DIAGNOSIS — M51.27 HERNIATED NUCLEUS PULPOSUS, L5-S1, LEFT: Primary | ICD-10-CM

## 2024-09-18 DIAGNOSIS — Z98.890 S/P LUMBAR MICRODISCECTOMY: ICD-10-CM

## 2024-09-18 PROCEDURE — 99024 POSTOP FOLLOW-UP VISIT: CPT

## 2024-09-18 PROCEDURE — 3079F DIAST BP 80-89 MM HG: CPT

## 2024-09-18 PROCEDURE — 3008F BODY MASS INDEX DOCD: CPT

## 2024-09-18 PROCEDURE — 3075F SYST BP GE 130 - 139MM HG: CPT

## 2024-09-18 NOTE — PROGRESS NOTES
Established patient:  Reason for follow up: Post-op   Left Lumbar  5 - Sacral 1 minimally invasive microdiscectomy     Numeric Rating Scale: (No Pain) 0  to  10 (Worst Pain)        Pain at Present:  0/10       Distribution of Pain:    left    Most recent treatments for Current Pain Condition:   Surgery  Response to treatment: some relief  Left side foot feels numb with tingling when sitting down started 2 back up 2 weeks ago.

## 2024-09-18 NOTE — PROGRESS NOTES
Patient: Tre Mckinley  Medical Record Number: [id number]  YOB: 1995  PCP: Hernan Perez MD    Reason for visit: Lumbar follow up, post op visit     HISTORY OF CHIEF COMPLAINT:    Tre Mckinley is a very pleasant 29 year old male who presents for post operative follow up visit   S/p 07/11/2024: Left L5-S1 minimally invasive microdiscectomy   Patient was recently seen in office by Dr. Busby on 8/23. He reported significant improvement of his left leg pain. Dr. Busby recommended work conditioning PT. He called the office on 9/6 with complaint of left leg numbness/tingling while in a seated position. I recommended conservative treatment at that time. He presents today for follow up.   Present, the patient reports improvement of the numbness described above.  At this point he just has some tingling in the left foot that occurs when he is in a seated position.  He does have some intermittent low back pain aggravated by increased activity.  Overall, his pain is much improved from preop.  He is back to work with restrictions.  He is concerned that he is not lifting/moving properly due to flare of low back pain while he is at work. He has had difficulty with scheduling appointments with PT for work conditioning.  He has not done any PT as of yet.  Patient Denies bladder/bowel incontinence/retention. No numbness, tingling or loss of sensation to the buttocks, inner surface of the thighs or perineum.     Past Medical History:    Bursitis    Generalized anxiety disorder    Lumbar herniated disc    Non-alcoholic fatty liver disease      Past Surgical History:   Procedure Laterality Date    Back surgery  07/2024    1.  Left L5 subtotal inferior hemilaminectomy 2.  Left S1 subtotal superior hemilaminectomy 3.  Left L5-S1 medial facetectomy 4.  Left L5-S1 minimally invasive microdiscectomy 5.  Use of operating microscope with surgical microdissection techniques  6.  Use of intraoperative  fluoroscopy with live interpretation of intraoperative imaging 7.  Epidural steroid application, left L5-S1    Lasik        Family History   Problem Relation Age of Onset    Psoriasis Father     Other (smoking) Father     Diabetes Mother         Biological mother    Anxiety Mother     Asthma Mother     Arthritis Mother         Back    No Known Problems Maternal Grandmother     No Known Problems Maternal Grandfather     No Known Problems Paternal Grandmother     No Known Problems Paternal Grandfather     Anxiety Sister     Psoriasis Sister     Other (Brain aneurysm) Paternal Aunt     Colon Cancer Neg     Prostate Cancer Neg       Social History     Socioeconomic History    Marital status: Single   Tobacco Use    Smoking status: Never    Smokeless tobacco: Never   Vaping Use    Vaping status: Never Used   Substance and Sexual Activity    Alcohol use: Not Currently    Drug use: Yes     Types: Cannabis     Comment: uses for sleep occasionally    Sexual activity: Yes     Partners: Female      No Known Allergies   Current Medications:  Current Outpatient Medications   Medication Sig Dispense Refill    methocarbamol 750 MG Oral Tab Take 1 tablet (750 mg total) by mouth 3 (three) times daily. 60 tablet 0    oxyCODONE 5 MG Oral Tab Take 1 tablet (5 mg total) by mouth every 4 (four) hours as needed for Pain. 30 tablet 0    Naloxone HCl 4 MG/0.1ML Nasal Liquid 4 mg by Nasal route as needed. If patient remains unresponsive, repeat dose in other nostril 2-5 minutes after first dose. 1 kit 0        REVIEW OF SYSTEMS   Comprehensive review of systems done. Negative except what is outlined in the above HPI.     PHYSICAL EXAMINATION    vitals were not taken for this visit.   GENERAL: Very pleasant patient is in no apparent distress. Sitting comfortably in the examination chair.   HEENT: Normocephalic, atraumatic.  SKIN: Warm and dry  NEURO: Awake, alert and orientated. Speech fluent, comprehension intact, answering questions  appropriately.     SPINE:  Gait/Coordination: Gait deferred  Sensation: Sensory deficits noted on bilateral lower extremities to light touch: None   Lumbar Integument/Incision: Lumbar incision appears to be well-healed, no erythema, edema or drainage.    Palpation: Non tender to palpation of midline lumbar spine or paraspinal musculature.    Moving bilateral upper extremities spontaneously to full resistance     Lower Extremity Strength:     Iliopsoas  Hamstrings   Quads    D-flexion P-flexion   Right       5         5       5         5 5   Left       5         5       5         5 5       DATA:   None    IMAGING:   No recent imaging to review     MEDICAL DECISION MAKING:     ASSESSMENT and PLAN:  1. Herniated nucleus pulposus, L5-S1, left    2. S/P lumbar microdiscectomy      This is a very pleasant 29-year-old male who presents for postoperative follow-up, s/p left L5-S1 microdiscectomy on 7/11/2024.  He continues to make meaningful progress.  At this point his pain seems to be limited to some muscular low back pain with increasing activity.  He has not been to PT as of yet due to scheduling difficulties.  I would like him to continue with lifting restrictions at work until he is evaluated and treated by physical therapy.    PLAN:   1. Medication: None prescribed  2. Imaging:    - Reviewed today: No recent imaging to review   - Ordered today: None      3. Activity: Patient may continue to gradually return to normal activity.  I would recommend 30 pound lifting restriction.   4. Referral: Referral for PT provided.  I advised the patient to see if external PT site can do work conditioning for him.  He has not been able to get in through our [place] locations to do this.  He has had no physical therapy as of yet.  If PT cannot do work conditioning program with him, he he may do a regular PT program with the goal of decreasing pain, increasing mobility, increasing strength.  5. Work: Work letter provided indicating  lifting restriction.  I would like the patient to be evaluated by physical therapy and begin with a PT program prior to lifting all his restrictions.  6. Follow up in 4-6 weeks after beginning PT or call or follow up sooner or go to the ED for any new, worsening or concerning signs or symptoms           Total visit time: 30 minutes  More than 50% spent coordinating care, providing patient education, reviewing imaging and counseling.    Aimee Pratt M.S., PA-C  [address], [address] [id number] [phone]  9/18/2024 10:08 AM    Dragon speech recognition software was used to prepare this note. If a word or phrase is confusing, it is likely due to a failure of recognition. Please contact me with any questions or clarifications.

## 2024-09-18 NOTE — TELEPHONE ENCOUNTER
Message below noted.    Pt requesting new PT order. It should not say Work conditioning.     Routed to Provider.

## 2024-09-18 NOTE — TELEPHONE ENCOUNTER
Patient was den today and is asking if Physical therapy order can be updated , It should not say Work conditioning

## 2024-09-18 NOTE — TELEPHONE ENCOUNTER
I spoke with the patient directly.  The patient told me that the work conditioning program requires several hours of his time which he is unable to accommodate with his schedule.  I have provided a new order for therapy without work conditioning.

## 2024-09-24 ENCOUNTER — APPOINTMENT (OUTPATIENT)
Dept: PHYSICAL THERAPY | Age: 29
End: 2024-09-24
Payer: OTHER MISCELLANEOUS

## 2024-09-27 ENCOUNTER — TELEPHONE (OUTPATIENT)
Dept: SURGERY | Facility: CLINIC | Age: 29
End: 2024-09-27

## 2024-09-27 NOTE — TELEPHONE ENCOUNTER
Received Initial Evaluation DOS 9/26/24 from [facility]     Endorsed to neurosurgery [facility] for clinical staff.

## 2024-10-01 ENCOUNTER — APPOINTMENT (OUTPATIENT)
Dept: PHYSICAL THERAPY | Age: 29
End: 2024-10-01
Payer: OTHER MISCELLANEOUS

## 2024-10-02 ENCOUNTER — MED REC SCAN ONLY (OUTPATIENT)
Dept: SURGERY | Facility: CLINIC | Age: 29
End: 2024-10-02

## 2024-10-03 ENCOUNTER — APPOINTMENT (OUTPATIENT)
Dept: PHYSICAL THERAPY | Age: 29
End: 2024-10-03
Payer: OTHER MISCELLANEOUS

## 2024-10-08 ENCOUNTER — TELEPHONE (OUTPATIENT)
Dept: SURGERY | Facility: CLINIC | Age: 29
End: 2024-10-08

## 2024-10-08 NOTE — TELEPHONE ENCOUNTER
Received initial evaluation note DOS 09/26/24 from [facility] , for review and signature from provider  Endorsed in neuro surgery bin for clinical staff.

## 2024-10-09 ENCOUNTER — APPOINTMENT (OUTPATIENT)
Dept: PHYSICAL THERAPY | Age: 29
End: 2024-10-09
Payer: OTHER MISCELLANEOUS

## 2024-10-10 ENCOUNTER — APPOINTMENT (OUTPATIENT)
Dept: PHYSICAL THERAPY | Age: 29
End: 2024-10-10
Payer: OTHER MISCELLANEOUS

## 2024-10-11 NOTE — TELEPHONE ENCOUNTER
Received initial evaluation note DOS 09/26/24 from [facility]   received by MA clinical staff, endorsed to provider for review. Placed on Ale's desk   Will be sent to scanning once received back from provider.

## 2024-10-16 ENCOUNTER — APPOINTMENT (OUTPATIENT)
Dept: PHYSICAL THERAPY | Age: 29
End: 2024-10-16
Payer: OTHER MISCELLANEOUS

## 2024-10-16 ENCOUNTER — OFFICE VISIT (OUTPATIENT)
Dept: SURGERY | Facility: CLINIC | Age: 29
End: 2024-10-16
Payer: OTHER MISCELLANEOUS

## 2024-10-16 VITALS
WEIGHT: 188 LBS | HEIGHT: 67 IN | DIASTOLIC BLOOD PRESSURE: 73 MMHG | SYSTOLIC BLOOD PRESSURE: 121 MMHG | OXYGEN SATURATION: 96 % | HEART RATE: 71 BPM | BODY MASS INDEX: 29.51 KG/M2

## 2024-10-16 DIAGNOSIS — Z98.890 S/P LUMBAR MICRODISCECTOMY: ICD-10-CM

## 2024-10-16 DIAGNOSIS — M54.50 ACUTE BILATERAL LOW BACK PAIN WITHOUT SCIATICA: ICD-10-CM

## 2024-10-16 DIAGNOSIS — M51.27 HERNIATED NUCLEUS PULPOSUS, L5-S1, LEFT: Primary | ICD-10-CM

## 2024-10-16 PROCEDURE — 99213 OFFICE O/P EST LOW 20 MIN: CPT

## 2024-10-16 NOTE — TELEPHONE ENCOUNTER
Provider has reviewed and signed Initial Evaluation DOS 09/26/24 from [facility].    Paperwork faxed to #[phone]  Received fax confirmation   Paperwork sent to scan

## 2024-10-16 NOTE — PROGRESS NOTES
Patient: Tre Mckinley  Medical Record Number: [id number]  YOB: 1995  PCP: Hernan Perez MD    Reason for visit: Lumbar follow up    HISTORY OF CHIEF COMPLAINT:    Tre Mckinley is a very pleasant 29 year old male who presents for follow up visit   S/p 7/11/2024 L5-S1 minimally invasive microdiscectomy   LOV was 9/18. Patient had reported some left leg numbness and tingling while in a seated position. He had not yet done any PT due to scheduling issues.   At present, patient reports significant improvement of the above.  The left leg tingling is intermittent and seems to occur when sitting for a long period of time.  At this point, he denies any radiating left leg pain.  He does have some axial back pain when lifting.  He feels that this is getting better.  He has been doing physical therapy which he feels has helped with his strength and symptoms.  He is back to work and tolerating this well.    Past Medical History:    Bursitis    Generalized anxiety disorder    Lumbar herniated disc    Non-alcoholic fatty liver disease      Past Surgical History:   Procedure Laterality Date    Back surgery  07/2024    1.  Left L5 subtotal inferior hemilaminectomy 2.  Left S1 subtotal superior hemilaminectomy 3.  Left L5-S1 medial facetectomy 4.  Left L5-S1 minimally invasive microdiscectomy 5.  Use of operating microscope with surgical microdissection techniques  6.  Use of intraoperative fluoroscopy with live interpretation of intraoperative imaging 7.  Epidural steroid application, left L5-S1    Lasik        Family History   Problem Relation Age of Onset    Psoriasis Father     Other (smoking) Father     Diabetes Mother         Biological mother    Anxiety Mother     Asthma Mother     Arthritis Mother         Back    No Known Problems Maternal Grandmother     No Known Problems Maternal Grandfather     No Known Problems Paternal Grandmother     No Known Problems Paternal Grandfather      Anxiety Sister     Psoriasis Sister     Other (Brain aneurysm) Paternal Aunt     Colon Cancer Neg     Prostate Cancer Neg       Social History     Socioeconomic History    Marital status: Single   Tobacco Use    Smoking status: Never    Smokeless tobacco: Never   Vaping Use    Vaping status: Never Used   Substance and Sexual Activity    Alcohol use: Not Currently    Drug use: Yes     Types: Cannabis     Comment: uses for sleep occasionally    Sexual activity: Yes     Partners: Female      Allergies[1]   Current Medications:  Current Outpatient Medications   Medication Sig Dispense Refill    methocarbamol 750 MG Oral Tab Take 1 tablet (750 mg total) by mouth 3 (three) times daily. (Patient not taking: Reported on 10/16/2024) 60 tablet 0    oxyCODONE 5 MG Oral Tab Take 1 tablet (5 mg total) by mouth every 4 (four) hours as needed for Pain. (Patient not taking: Reported on 10/16/2024) 30 tablet 0    Naloxone HCl 4 MG/0.1ML Nasal Liquid 4 mg by Nasal route as needed. If patient remains unresponsive, repeat dose in other nostril 2-5 minutes after first dose. (Patient not taking: Reported on 10/16/2024) 1 kit 0        REVIEW OF SYSTEMS   Comprehensive review of systems done. Negative except what is outlined in the above HPI.     PHYSICAL EXAMINATION    height is 67\" and weight is 188 lb (85.3 kg). His blood pressure is 121/73 and his pulse is 71. His oxygen saturation is 96%.   GENERAL: Very pleasant patient is in no apparent distress. Sitting comfortably in the examination chair.   HEENT: Normocephalic, atraumatic.  RESPIRATORY RATE: Easy and Even  SKIN: Warm and dry  NEURO: Awake, alert and orientated. Speech fluent, comprehension intact, answering questions appropriately.     SPINE:  Gait/Coordination: Gait deferred   Sensation: Sensory deficits noted on bilateral lower extremities to light touch: None     Moving bilateral upper extremities spontaneously to full resistance     Lower Extremity Strength:     Iliopsoas   Hamstrings   Quads    D-flexion P-flexion   Right       5         5       5         5 5   Left       5         5       5         5 5       DATA:   None    IMAGING:   No recent imaging available to review     MEDICAL DECISION MAKING:     ASSESSMENT and PLAN:  1. Herniated nucleus pulposus, L5-S1, left    2. S/P lumbar microdiscectomy    3. Acute bilateral low back pain without sciatica      Tre Mckinley is a very pleasant 29-year-old male who presents for follow-up visit.  S/p L5-S1 microdiscectomy on 7/11/2024. He has started physical therapy which feels has significantly improved his strength.  At this point his pain is limited to some occasional axial back pain. He is very pleased with his progress.    PLAN:   1. Medication: None prescribed   2. Imaging:    - Reviewed today: None    - Ordered today: None     3. Activity: No restrictions at this time.   4. Referral: None given. Patient states he does have PT visits remaining.  Advised him to continue with these.  5. Work: Patient is cleared to return to work full-time without restrictions.  Return to work letter was provided to the patient's case management nurse at her request.  6. Follow up as needed or call or follow up sooner or go to the ED for any new, worsening or concerning signs or symptoms       Total visit time: 30 minutes  More than 50% spent coordinating care, providing patient education, reviewing imaging and counseling.    Aimee Pratt M.S., PA-C  [address], [address] [address]  [phone]  10/16/2024 2:12 PM    Dragon speech recognition software was used to prepare this note. If a word or phrase is confusing, it is likely due to a failure of recognition. Please contact me with any questions or clarifications.         [1] No Known Allergies

## 2024-10-16 NOTE — PROGRESS NOTES
Last Office Visit: 09/18/2024     Established patient presents to clinic for a follow up after he completed 5-6 sessions of physical therapy. He states that physical therapy is providing some relief but he still has the pressure in his lower back.

## 2024-10-22 ENCOUNTER — APPOINTMENT (OUTPATIENT)
Dept: PHYSICAL THERAPY | Age: 29
End: 2024-10-22
Payer: OTHER MISCELLANEOUS

## 2024-10-24 ENCOUNTER — APPOINTMENT (OUTPATIENT)
Dept: PHYSICAL THERAPY | Age: 29
End: 2024-10-24
Payer: OTHER MISCELLANEOUS

## 2024-10-29 ENCOUNTER — APPOINTMENT (OUTPATIENT)
Dept: PHYSICAL THERAPY | Age: 29
End: 2024-10-29
Payer: OTHER MISCELLANEOUS

## 2024-10-31 ENCOUNTER — TELEPHONE (OUTPATIENT)
Dept: SURGERY | Facility: CLINIC | Age: 29
End: 2024-10-31

## 2024-10-31 ENCOUNTER — APPOINTMENT (OUTPATIENT)
Dept: PHYSICAL THERAPY | Age: 29
End: 2024-10-31
Payer: OTHER MISCELLANEOUS

## 2024-10-31 NOTE — TELEPHONE ENCOUNTER
Fax received from [facility] regarding Functional Status Report, dos 10/30/24. Report left in nurses bin for provider review.

## 2024-11-01 ENCOUNTER — MED REC SCAN ONLY (OUTPATIENT)
Dept: SURGERY | Facility: CLINIC | Age: 29
End: 2024-11-01

## 2024-11-01 NOTE — TELEPHONE ENCOUNTER
Functional Status Report DOS 10/30/24 from [facility]  received by MA clinical staff.   Endorsed to provider for review/signature, placed on Sergo's desk.

## (undated) DEVICE — SUT COAT VCRL+ 3-0 18IN CP-2 ABSRB UD ANTIBAC

## (undated) DEVICE — COVER,MAYO STAND,STERILE: Brand: MEDLINE

## (undated) DEVICE — UNDYED BRAIDED (POLYGLACTIN 910), SYNTHETIC ABSORBABLE SUTURE: Brand: COATED VICRYL

## (undated) DEVICE — SPK10186 WILSON TABLE KIT: Brand: SPK10186 WILSON TABLE KIT

## (undated) DEVICE — SOLUTION IRRIG 1000ML 0.9% NACL USP BTL

## (undated) DEVICE — GLOVE SUR 8 SENSICARE NEOPR PWD F

## (undated) DEVICE — SOLUTION PREP 26ML 0.7% POVACRYLEX 74% ISO

## (undated) DEVICE — SNAP KOVER: Brand: UNBRANDED

## (undated) DEVICE — SPONGE GZ 4X4IN COT 12 PLY TYP VII WVN C

## (undated) DEVICE — PENCIL ES BTTN SWCH W/ TIP HOLSTER E-Z CLN

## (undated) DEVICE — SUT COAT VCRL+ 0 27IN UR-6 ABSRB VLT ANTIBACT

## (undated) DEVICE — NEEDLE SPNL 18GA L3.5IN W/ QNCKE SHARPER BVL

## (undated) DEVICE — ADHESIVE LIQ 2/3ML VI MASTISOL

## (undated) DEVICE — ELECTRODE ES 2.75IN PTFE BLDE MOD E-Z CLN

## (undated) DEVICE — GLOVE SUR 8 SENSICARE PI PIP GRN PWD F

## (undated) DEVICE — E-Z CLEAN, NON-STICK, PTFE COATED, ELECTROSURGICAL BLADE ELECTRODE, MODIFIED EXTENDED INSULATION, 6.5 INCH (16.5 CM): Brand: MEGADYNE

## (undated) DEVICE — MICRO KOVER: Brand: UNBRANDED

## (undated) DEVICE — PACK CDS LAMINECTOMY

## (undated) DEVICE — SHEET,DRAPE,53X77,STERILE: Brand: MEDLINE

## (undated) DEVICE — 3M™ IOBAN™ 2 ANTIMICROBIAL INCISE DRAPE 6650EZ: Brand: IOBAN™ 2

## (undated) DEVICE — KIT HEMSTAT MTRX 8ML PORCINE GEL HUM THROM

## (undated) DEVICE — GLOVE SUR 7.5 SENSICARE PI MIC PIP CRM PWD F

## (undated) DEVICE — SYRINGE,EAR/ULCER, 2 OZ, STERILE: Brand: MEDLINE

## (undated) DEVICE — INTENDED USE FOR SURGICAL MARKING ON INTACT SKIN, ALSO PROVIDES A PERMANENT METHOD OF IDENTIFYING OBJECTS IN THE OPERATING ROOM: Brand: WRITESITE® PLUS MINI PREP RESISTANT MARKER

## (undated) NOTE — LETTER
24    RE: Tre Mckinley     : 1995    Dear Dr. Perez,    This letter is to inform you that your patient has been scheduled for surgery with Dr. Busby on 24 at [facility]. Pre-operative clearance has been requested within 30 days of surgery.  We have asked the patient to contact your office to schedule a pre-operative visit.     Diagnosis: Herniated nucleus pulposus, L5-S1, left, Left lumbosacral radiculopathy, Numbness and tingling of left leg  Procedure: LEFT LUMBAR 5 - SACRAL 1 MINIMALLY INVASIVE MICRODISCECTOMY      A Pre-operative History & Physical is needed for medical clearance within 30 days of surgery. Please address patient's active problems and potential risks of having surgery considering their medical history.  Pre-op labs are scheduled through the [place] [facility] [facility] department. If any labs/testing are being done through the PCP office, then results should be faxed to the pre-admission testing department at [facility] at [phone]. Our pre-operative lab orders are located in our surgery order, if the patient would like these done through your office, you will need to place separate orders.     Please fax clearance letter/office visit note to our office at fax #: [phone]. Your office note must clearly indicate if the patient is medically cleared for surgery or not.    The following orders will be placed by pre-admission testing:  CBC  CMP  Type and Screen   PT/PTT/INR  MRSA/MSSA Nasal Swab  EKG  (*And any other pertinent testing based on patient's current clinical condition.)    If you have any questions, you may contact our office at [phone], option # 2.    Thank you,

## (undated) NOTE — LETTER
2/18/2020          To Whom It May Concern:    Lexii Jones is currently under my medical care and may not return to work at this time. Please excuse Abingdon Given for 3 days. He may return to work on Friday, 2/21/2020 .   Activity is restricted as follo

## (undated) NOTE — ED AVS SNAPSHOT
Seven Maradiaga   MRN: [id number]    Department:  [facility] Emergency Department   Date of Visit:  5/18/2018           Disclosure     Insurance plans vary and the physician(s) referred by the ER may not be covered by your plan.  Please contac CARE PHYSICIAN AT ONCE OR RETURN IMMEDIATELY TO THE EMERGENCY DEPARTMENT. If you have been prescribed any medication(s), please fill your prescription right away and begin taking the medication(s) as directed.   If you believe that any of the medications

## (undated) NOTE — LETTER
Date: 6/19/2024    Patient Name: Tre Mckinley          To Whom it may concern:    This letter has been written at the patient's request. The above patient was seen at [facility] for treatment of a medical condition.    This patient should be excused from attending work from 06/19/2024 through 07/24/2024.    The patient may return to work 07/25/2024 with the following limitations: light duty only. No lifting greater than 10 pounds.         Sincerely,    Candido Busby MD

## (undated) NOTE — ED AVS SNAPSHOT
Madhavi Adrian   MRN: [id number]    Department:  [facility] Emergency Department   Date of Visit:  4/13/2018           Disclosure     Insurance plans vary and the physician(s) referred by the ER may not be covered by your plan.  Please contac CARE PHYSICIAN AT ONCE OR RETURN IMMEDIATELY TO THE EMERGENCY DEPARTMENT. If you have been prescribed any medication(s), please fill your prescription right away and begin taking the medication(s) as directed.   If you believe that any of the medications

## (undated) NOTE — ED AVS SNAPSHOT
Seven Maradiaga   MRN: [id number]    Department:  [facility] Emergency Department   Date of Visit:  10/2/2019           Disclosure     Insurance plans vary and the physician(s) referred by the ER may not be covered by your plan.  Please contac CARE PHYSICIAN AT ONCE OR RETURN IMMEDIATELY TO THE EMERGENCY DEPARTMENT. If you have been prescribed any medication(s), please fill your prescription right away and begin taking the medication(s) as directed.   If you believe that any of the medications

## (undated) NOTE — ED AVS SNAPSHOT
Jo-Ann Hill   MRN: [id number]    Department:  [facility] Emergency Department   Date of Visit:  5/21/2018           Disclosure     Insurance plans vary and the physician(s) referred by the ER may not be covered by your plan.  Please contac CARE PHYSICIAN AT ONCE OR RETURN IMMEDIATELY TO THE EMERGENCY DEPARTMENT. If you have been prescribed any medication(s), please fill your prescription right away and begin taking the medication(s) as directed.   If you believe that any of the medications

## (undated) NOTE — LETTER
Date: 9/18/2024    Patient Name: Tre Mckinley          To Whom it may concern:    The above patient was seen at [facility] for treatment of a medical condition.  He recently had surgery.    The following restrictions should be observed given the nature of the patient's recent surgery:    -No lifting greater than 30 pounds  -No excessive bending/lifting/twisting  -15-minute breaks every 1 hour as needed    The patient's activity restrictions will be reviewed at upcoming follow-up visit.      Sincerely,           Aimee Pratt M.S., PAJame[address], [address] [address]  [phone]  9/18/2024 4:00 PM

## (undated) NOTE — LETTER
Date: 8/23/2024    Patient Name: Tre Mckinley          To Whom it may concern:    This letter has been written at the patient's request. The above patient was seen at [facility] for treatment of a medical condition.    The patient may return to work/school on 8/26/24 with the following limitations no lifting greater than 20 pounds, no repetitive bending or twisting.        Sincerely,    Candido Busby MD

## (undated) NOTE — LETTER
Date: 7/12/2024    Patient Name: Tre Mckinley        To Whom it may concern:    The above patient was seen at [facility] for treatment of a medical condition.     The patient has a medical condition which prevents him from safely performing his work. This patient should be excused from attending work from 7/12/2024 through 8/3/2024. He will follow up in our office during this time where his return to work status will be reevaluated.         Sincerely,        Aimee Pratt M.S., PA-C  [address], [facility] [address]  [place] [address]  [phone]  7/12/2024 9:53 AM

## (undated) NOTE — LETTER
Date: 8/2/2024    Patient Name: Tre Mckinley          To Whom it may concern:    The above patient was seen at [facility] for treatment of a medical condition. He recently had surgery.     This patient should be excused from attending work from 08/02/2024 through 08/24/2024. The patient will be seen in our office for follow up during this time where his return to work status will be reevaluated.     Please contact us with any questions.        Sincerely,        Aimee Pratt M.S., PA-C  [address], [address] [address]  [phone]  8/2/2024 10:16 AM

## (undated) NOTE — LETTER
Date: 10/16/2024    Patient Name: Tre Mckinley          To Whom it may concern:    This letter has been written at the patient's request. The above patient recently underwent surgery at [facility].     This patient may return to work on 10/17/2024 without restrictions.     If this office may be of further assistance, please do not hesitate to reach out.         Sincerely,        Aimee Pratt M.S., PA-C  [address], [address] [address]  [phone]  10/16/2024 2:36 PM

## (undated) NOTE — LETTER
Date: 2024    Patient Name: Tre Mckinley  : 1995      To Whom it May Concern,     Your employee, Tre Mckinley, had surgery on 24 and is currently in recovery.  He may return to work as of 24 for light duty only with the following restrictions:    -no lifting more than 15 lbs  -frequent breaks as needed  -no prolonged sitting, standing or walking    Please contact our office with any questions.     Sincerely,        Aimee Pratt M.S., PA-C  [address], [address] [address]  [phone]  2024 3:50 PM